# Patient Record
Sex: MALE | Race: WHITE | NOT HISPANIC OR LATINO | Employment: FULL TIME | URBAN - METROPOLITAN AREA
[De-identification: names, ages, dates, MRNs, and addresses within clinical notes are randomized per-mention and may not be internally consistent; named-entity substitution may affect disease eponyms.]

---

## 2021-02-26 ENCOUNTER — OFFICE VISIT (OUTPATIENT)
Dept: FAMILY MEDICINE CLINIC | Facility: CLINIC | Age: 36
End: 2021-02-26
Payer: COMMERCIAL

## 2021-02-26 VITALS
WEIGHT: 161 LBS | TEMPERATURE: 97.3 F | RESPIRATION RATE: 17 BRPM | DIASTOLIC BLOOD PRESSURE: 76 MMHG | BODY MASS INDEX: 26.82 KG/M2 | OXYGEN SATURATION: 97 % | SYSTOLIC BLOOD PRESSURE: 114 MMHG | HEART RATE: 79 BPM | HEIGHT: 65 IN

## 2021-02-26 DIAGNOSIS — Z13.29 SCREENING FOR THYROID DISORDER: ICD-10-CM

## 2021-02-26 DIAGNOSIS — Z13.1 SCREENING FOR DIABETES MELLITUS: ICD-10-CM

## 2021-02-26 DIAGNOSIS — Z13.0 SCREENING FOR IRON DEFICIENCY ANEMIA: ICD-10-CM

## 2021-02-26 DIAGNOSIS — K21.9 GASTROESOPHAGEAL REFLUX DISEASE WITHOUT ESOPHAGITIS: Primary | ICD-10-CM

## 2021-02-26 DIAGNOSIS — Z13.220 SCREENING FOR CHOLESTEROL LEVEL: ICD-10-CM

## 2021-02-26 DIAGNOSIS — M54.9 COSTOVERTEBRAL ANGLE TENDERNESS: ICD-10-CM

## 2021-02-26 LAB
SL AMB  POCT GLUCOSE, UA: NORMAL
SL AMB LEUKOCYTE ESTERASE,UA: NORMAL
SL AMB POCT BILIRUBIN,UA: NORMAL
SL AMB POCT BLOOD,UA: NORMAL
SL AMB POCT CLARITY,UA: CLEAR
SL AMB POCT COLOR,UA: YELLOW
SL AMB POCT KETONES,UA: NORMAL
SL AMB POCT NITRITE,UA: NORMAL
SL AMB POCT PH,UA: 7
SL AMB POCT SPECIFIC GRAVITY,UA: 1.01
SL AMB POCT URINE PROTEIN: NORMAL
SL AMB POCT UROBILINOGEN: 0.2

## 2021-02-26 PROCEDURE — 3725F SCREEN DEPRESSION PERFORMED: CPT | Performed by: FAMILY MEDICINE

## 2021-02-26 PROCEDURE — 99204 OFFICE O/P NEW MOD 45 MIN: CPT | Performed by: FAMILY MEDICINE

## 2021-02-26 PROCEDURE — 81003 URINALYSIS AUTO W/O SCOPE: CPT | Performed by: FAMILY MEDICINE

## 2021-02-26 RX ORDER — OMEPRAZOLE 20 MG/1
20 CAPSULE, DELAYED RELEASE ORAL EVERY OTHER DAY
COMMUNITY

## 2021-02-26 NOTE — PROGRESS NOTES
Assessment/Plan:   1  Gastroesophageal reflux disease without esophagitis  Symptoms appears stable  Will continue with dietary trigger avoidance as well as his over-the-counter treatment with omeprazole  - CBC; Future  - Comprehensive metabolic panel; Future  - Lipid Panel with Direct LDL reflex; Future  - TSH, 3rd generation with Free T4 reflex; Future  - Hemoglobin A1C; Future    2  Costovertebral angle tenderness  Is unclear as to exact cause of patient's CVA tenderness  His urinalysis was negative for any blood or infection  Will check blood work to rule out gross abnormalities  If symptoms are persisting, will consider imaging   - POCT urine dip auto non-scope  - CBC; Future  - Comprehensive metabolic panel; Future  - Lipid Panel with Direct LDL reflex; Future  - TSH, 3rd generation with Free T4 reflex; Future  - Hemoglobin A1C; Future        BMI Counseling: Body mass index is 27 kg/m²  The BMI is above normal  Nutrition recommendations include decreasing portion sizes, encouraging healthy choices of fruits and vegetables, decreasing fast food intake, consuming healthier snacks and limiting drinks that contain sugar  Exercise recommendations include moderate physical activity 150 minutes/week and exercising 3-5 times per week  No pharmacotherapy was ordered  Depression Screening and Follow-up Plan: Patient's depression screening was positive with a PHQ-2 score of 0  Clincally patient does not have depression  No treatment is required  There are no diagnoses linked to this encounter  Subjective:       Chief Complaint   Patient presents with    Establish Care    Back Pain     Lower L back pain for the past few months     Heartburn      Patient ID: Vearl Closs is a 38 y o  male presents today as a new patient to establish care  He has gastroesophageal reflux  He states that he has been taking over-the-counter Prilosec and this has been effective for him    He has an acute complaint today of left mid back pain  He has had this for the past few months  Symptoms started gradually  He denies any specific cause or trauma  He has not taken anything for symptom relief  HPI    Review of Systems   Constitutional: Negative for activity change, chills, fatigue and fever  HENT: Negative for congestion, ear pain, sinus pressure and sore throat  Eyes: Negative for redness, itching and visual disturbance  Respiratory: Negative for cough and shortness of breath  Cardiovascular: Negative for chest pain and palpitations  Gastrointestinal: Negative for abdominal pain, diarrhea and nausea  Endocrine: Negative for cold intolerance and heat intolerance  Genitourinary: Negative for dysuria, flank pain and frequency  Musculoskeletal: Negative for arthralgias, back pain, gait problem and myalgias  Skin: Negative for color change  Allergic/Immunologic: Negative for environmental allergies  Neurological: Negative for dizziness, numbness and headaches  Psychiatric/Behavioral: Negative for behavioral problems and sleep disturbance  The following portions of the patient's history were reviewed and updated as appropriate : past family history, past medical history, past social history and past surgical history  Current Outpatient Medications:     omeprazole (PriLOSEC) 20 mg delayed release capsule, Take 20 mg by mouth every other day, Disp: , Rfl:     fluocinonide (LIDEX) 0 05 % cream, Apply topically, Disp: , Rfl:          Objective:         Vitals:    02/26/21 1546   BP: 114/76   BP Location: Left arm   Patient Position: Sitting   Cuff Size: Adult   Pulse: 79   Resp: 17   Temp: (!) 97 3 °F (36 3 °C)   TempSrc: Tympanic   SpO2: 97%   Weight: 73 kg (161 lb)   Height: 5' 4 75" (1 645 m)     Physical Exam  Vitals signs reviewed  Constitutional:       Appearance: He is well-developed  HENT:      Head: Normocephalic and atraumatic        Nose: Nose normal       Mouth/Throat: Pharynx: No oropharyngeal exudate  Eyes:      General: No scleral icterus  Right eye: No discharge  Left eye: No discharge  Pupils: Pupils are equal, round, and reactive to light  Neck:      Musculoskeletal: Normal range of motion and neck supple  Trachea: No tracheal deviation  Cardiovascular:      Rate and Rhythm: Normal rate and regular rhythm  Pulses:           Dorsalis pedis pulses are 2+ on the right side and 2+ on the left side  Posterior tibial pulses are 2+ on the right side and 2+ on the left side  Heart sounds: Normal heart sounds  No murmur  No friction rub  No gallop  Pulmonary:      Effort: Pulmonary effort is normal  No respiratory distress  Breath sounds: Normal breath sounds  No wheezing or rales  Abdominal:      General: Bowel sounds are normal  There is no distension  Palpations: Abdomen is soft  Tenderness: There is no abdominal tenderness  There is no guarding or rebound  Musculoskeletal: Normal range of motion  Lymphadenopathy:      Head:      Right side of head: No submental or submandibular adenopathy  Left side of head: No submental or submandibular adenopathy  Cervical: No cervical adenopathy  Right cervical: No superficial, deep or posterior cervical adenopathy  Left cervical: No superficial, deep or posterior cervical adenopathy  Skin:     General: Skin is warm and dry  Findings: No erythema  Neurological:      Mental Status: He is alert and oriented to person, place, and time  Cranial Nerves: No cranial nerve deficit  Sensory: No sensory deficit  Psychiatric:         Mood and Affect: Mood is not anxious or depressed  Speech: Speech normal          Behavior: Behavior normal          Thought Content:  Thought content normal          Judgment: Judgment normal

## 2021-03-03 ENCOUNTER — LAB (OUTPATIENT)
Dept: LAB | Facility: CLINIC | Age: 36
End: 2021-03-03
Payer: COMMERCIAL

## 2021-03-03 DIAGNOSIS — Z13.1 SCREENING FOR DIABETES MELLITUS: ICD-10-CM

## 2021-03-03 DIAGNOSIS — M54.9 COSTOVERTEBRAL ANGLE TENDERNESS: ICD-10-CM

## 2021-03-03 DIAGNOSIS — Z13.29 SCREENING FOR THYROID DISORDER: ICD-10-CM

## 2021-03-03 DIAGNOSIS — E78.00 ELEVATED CHOLESTEROL: Primary | ICD-10-CM

## 2021-03-03 DIAGNOSIS — K21.9 GASTROESOPHAGEAL REFLUX DISEASE WITHOUT ESOPHAGITIS: ICD-10-CM

## 2021-03-03 DIAGNOSIS — Z13.220 SCREENING FOR CHOLESTEROL LEVEL: ICD-10-CM

## 2021-03-03 DIAGNOSIS — Z13.0 SCREENING FOR IRON DEFICIENCY ANEMIA: ICD-10-CM

## 2021-03-03 LAB
ALBUMIN SERPL BCP-MCNC: 4.1 G/DL (ref 3.5–5)
ALP SERPL-CCNC: 111 U/L (ref 46–116)
ALT SERPL W P-5'-P-CCNC: 50 U/L (ref 12–78)
ANION GAP SERPL CALCULATED.3IONS-SCNC: 6 MMOL/L (ref 4–13)
AST SERPL W P-5'-P-CCNC: 27 U/L (ref 5–45)
BILIRUB SERPL-MCNC: 0.67 MG/DL (ref 0.2–1)
BUN SERPL-MCNC: 13 MG/DL (ref 5–25)
CALCIUM SERPL-MCNC: 9.7 MG/DL (ref 8.3–10.1)
CHLORIDE SERPL-SCNC: 109 MMOL/L (ref 100–108)
CHOLEST SERPL-MCNC: 241 MG/DL (ref 50–200)
CO2 SERPL-SCNC: 26 MMOL/L (ref 21–32)
CREAT SERPL-MCNC: 1.2 MG/DL (ref 0.6–1.3)
ERYTHROCYTE [DISTWIDTH] IN BLOOD BY AUTOMATED COUNT: 13.7 % (ref 11.6–15.1)
EST. AVERAGE GLUCOSE BLD GHB EST-MCNC: 114 MG/DL
GFR SERPL CREATININE-BSD FRML MDRD: 77 ML/MIN/1.73SQ M
GLUCOSE P FAST SERPL-MCNC: 102 MG/DL (ref 65–99)
HBA1C MFR BLD: 5.6 %
HCT VFR BLD AUTO: 48.8 % (ref 36.5–49.3)
HDLC SERPL-MCNC: 61 MG/DL
HGB BLD-MCNC: 16.2 G/DL (ref 12–17)
LDLC SERPL CALC-MCNC: 130 MG/DL (ref 0–100)
MCH RBC QN AUTO: 30.4 PG (ref 26.8–34.3)
MCHC RBC AUTO-ENTMCNC: 33.2 G/DL (ref 31.4–37.4)
MCV RBC AUTO: 92 FL (ref 82–98)
PLATELET # BLD AUTO: 235 THOUSANDS/UL (ref 149–390)
PMV BLD AUTO: 12.2 FL (ref 8.9–12.7)
POTASSIUM SERPL-SCNC: 4.5 MMOL/L (ref 3.5–5.3)
PROT SERPL-MCNC: 8.1 G/DL (ref 6.4–8.2)
RBC # BLD AUTO: 5.33 MILLION/UL (ref 3.88–5.62)
SODIUM SERPL-SCNC: 141 MMOL/L (ref 136–145)
TRIGL SERPL-MCNC: 249 MG/DL
TSH SERPL DL<=0.05 MIU/L-ACNC: 3.53 UIU/ML (ref 0.36–3.74)
WBC # BLD AUTO: 7.04 THOUSAND/UL (ref 4.31–10.16)

## 2021-03-03 PROCEDURE — 85027 COMPLETE CBC AUTOMATED: CPT

## 2021-03-03 PROCEDURE — 83036 HEMOGLOBIN GLYCOSYLATED A1C: CPT

## 2021-03-03 PROCEDURE — 84443 ASSAY THYROID STIM HORMONE: CPT

## 2021-03-03 PROCEDURE — 36415 COLL VENOUS BLD VENIPUNCTURE: CPT

## 2021-03-03 PROCEDURE — 80061 LIPID PANEL: CPT

## 2021-03-03 PROCEDURE — 80053 COMPREHEN METABOLIC PANEL: CPT

## 2021-03-08 ENCOUNTER — OFFICE VISIT (OUTPATIENT)
Dept: FAMILY MEDICINE CLINIC | Facility: CLINIC | Age: 36
End: 2021-03-08
Payer: COMMERCIAL

## 2021-03-08 VITALS
RESPIRATION RATE: 17 BRPM | OXYGEN SATURATION: 98 % | SYSTOLIC BLOOD PRESSURE: 112 MMHG | HEIGHT: 65 IN | HEART RATE: 100 BPM | BODY MASS INDEX: 26.59 KG/M2 | WEIGHT: 159.6 LBS | DIASTOLIC BLOOD PRESSURE: 78 MMHG | TEMPERATURE: 97.3 F

## 2021-03-08 DIAGNOSIS — M54.9 COSTOVERTEBRAL ANGLE TENDERNESS: ICD-10-CM

## 2021-03-08 DIAGNOSIS — E78.00 ELEVATED CHOLESTEROL: Primary | ICD-10-CM

## 2021-03-08 PROCEDURE — 3008F BODY MASS INDEX DOCD: CPT | Performed by: FAMILY MEDICINE

## 2021-03-08 PROCEDURE — 99214 OFFICE O/P EST MOD 30 MIN: CPT | Performed by: FAMILY MEDICINE

## 2021-03-08 NOTE — PROGRESS NOTES
Assessment/Plan:   1  Elevated cholesterol   reviewed patient's cholesterol today  His triglycerides were moderately elevated  At this time, he must maintain a very strict low-fat / low-cholesterol diet as well as a regular exercise plan  Will hold off on starting any statin treatment at this time  Will recheck his blood work in 6 months  If persistently elevated, will consider the initiation of treatment  2  Costovertebral angle tenderness    Reviewed patient's symptoms today  At this time, is unclear as to the exact cause of his rib pain / CVA tenderness  At this time, will check x-ray to rule out gross abnormalities  Symptoms may likely be musculoskeletal   He was advised on specific thoracic and rib stretching exercises  Follow-up in 6 months  - XR ribs 2 vw left; Future           There are no diagnoses linked to this encounter  Subjective:       Chief Complaint   Patient presents with    Follow-up     BW review       Patient ID: Hiro Ram is a 38 y o  male  Presents today for follow-up to review his recent blood work  He states that he has been having persistent problems with his left-sided rib pain  He states the pain appears evident when he bends and picks up his child  HPI    Review of Systems   Constitutional: Negative for activity change, chills, fatigue and fever  HENT: Negative for congestion, ear pain, sinus pressure and sore throat  Eyes: Negative for redness, itching and visual disturbance  Respiratory: Negative for cough and shortness of breath  Cardiovascular: Negative for chest pain and palpitations  Gastrointestinal: Negative for abdominal pain, diarrhea and nausea  Endocrine: Negative for cold intolerance and heat intolerance  Genitourinary: Negative for dysuria, flank pain and frequency  Musculoskeletal: Negative for arthralgias, back pain, gait problem and myalgias  Skin: Negative for color change     Allergic/Immunologic: Negative for environmental allergies  Neurological: Negative for dizziness, numbness and headaches  Psychiatric/Behavioral: Negative for behavioral problems and sleep disturbance  The following portions of the patient's history were reviewed and updated as appropriate : past family history, past medical history, past social history and past surgical history  Current Outpatient Medications:     omeprazole (PriLOSEC) 20 mg delayed release capsule, Take 20 mg by mouth every other day, Disp: , Rfl:     fluocinonide (LIDEX) 0 05 % cream, Apply topically, Disp: , Rfl:          Objective:         Vitals:    03/08/21 1626   BP: 112/78   BP Location: Left arm   Patient Position: Sitting   Cuff Size: Adult   Pulse: 100   Resp: 17   Temp: (!) 97 3 °F (36 3 °C)   TempSrc: Tympanic   SpO2: 98%   Weight: 72 4 kg (159 lb 9 6 oz)   Height: 5' 4 75" (1 645 m)     Physical Exam  Vitals signs reviewed  Constitutional:       Appearance: Normal appearance  He is well-developed  HENT:      Head: Normocephalic and atraumatic  Right Ear: Hearing normal       Left Ear: Hearing normal       Nose: Nose normal  No septal deviation  Eyes:      General: Lids are normal       Pupils: Pupils are equal, round, and reactive to light  Neck:      Musculoskeletal: Normal range of motion  Thyroid: No thyroid mass or thyromegaly  Trachea: Trachea normal    Cardiovascular:      Rate and Rhythm: Normal rate  Pulmonary:      Effort: Pulmonary effort is normal  No respiratory distress  Breath sounds: No decreased breath sounds  Abdominal:      Tenderness: There is no guarding  Musculoskeletal: Normal range of motion  Skin:     General: Skin is warm and dry  Neurological:      Mental Status: He is alert and oriented to person, place, and time  Cranial Nerves: No cranial nerve deficit  Sensory: No sensory deficit     Psychiatric:         Speech: Speech normal          Behavior: Behavior normal          Thought Content:  Thought content normal          Judgment: Judgment normal

## 2021-03-17 ENCOUNTER — APPOINTMENT (OUTPATIENT)
Dept: RADIOLOGY | Facility: CLINIC | Age: 36
End: 2021-03-17
Payer: COMMERCIAL

## 2021-03-17 DIAGNOSIS — M54.9 COSTOVERTEBRAL ANGLE TENDERNESS: ICD-10-CM

## 2021-03-17 PROCEDURE — 71101 X-RAY EXAM UNILAT RIBS/CHEST: CPT

## 2021-04-05 DIAGNOSIS — Z23 ENCOUNTER FOR IMMUNIZATION: ICD-10-CM

## 2021-04-12 ENCOUNTER — IMMUNIZATIONS (OUTPATIENT)
Dept: FAMILY MEDICINE CLINIC | Facility: HOSPITAL | Age: 36
End: 2021-04-12

## 2021-04-12 DIAGNOSIS — Z23 ENCOUNTER FOR IMMUNIZATION: Primary | ICD-10-CM

## 2021-04-12 PROCEDURE — 91301 SARS-COV-2 / COVID-19 MRNA VACCINE (MODERNA) 100 MCG: CPT

## 2021-04-12 PROCEDURE — 0011A SARS-COV-2 / COVID-19 MRNA VACCINE (MODERNA) 100 MCG: CPT

## 2021-05-10 ENCOUNTER — IMMUNIZATIONS (OUTPATIENT)
Dept: FAMILY MEDICINE CLINIC | Facility: HOSPITAL | Age: 36
End: 2021-05-10

## 2021-05-10 DIAGNOSIS — Z23 ENCOUNTER FOR IMMUNIZATION: Primary | ICD-10-CM

## 2021-05-10 PROCEDURE — 0012A SARS-COV-2 / COVID-19 MRNA VACCINE (MODERNA) 100 MCG: CPT

## 2021-05-10 PROCEDURE — 91301 SARS-COV-2 / COVID-19 MRNA VACCINE (MODERNA) 100 MCG: CPT

## 2022-04-12 ENCOUNTER — APPOINTMENT (OUTPATIENT)
Dept: LAB | Facility: CLINIC | Age: 37
End: 2022-04-12
Payer: COMMERCIAL

## 2022-04-12 DIAGNOSIS — Z13.1 SCREENING FOR DIABETES MELLITUS: ICD-10-CM

## 2022-04-12 DIAGNOSIS — E78.00 ELEVATED CHOLESTEROL: ICD-10-CM

## 2022-04-12 LAB
ALBUMIN SERPL BCP-MCNC: 4.4 G/DL (ref 3.5–5)
ALP SERPL-CCNC: 106 U/L (ref 46–116)
ALT SERPL W P-5'-P-CCNC: 33 U/L (ref 12–78)
ANION GAP SERPL CALCULATED.3IONS-SCNC: 4 MMOL/L (ref 4–13)
AST SERPL W P-5'-P-CCNC: 19 U/L (ref 5–45)
BILIRUB SERPL-MCNC: 0.85 MG/DL (ref 0.2–1)
BUN SERPL-MCNC: 10 MG/DL (ref 5–25)
CALCIUM SERPL-MCNC: 9.4 MG/DL (ref 8.3–10.1)
CHLORIDE SERPL-SCNC: 110 MMOL/L (ref 100–108)
CHOLEST SERPL-MCNC: 188 MG/DL
CO2 SERPL-SCNC: 25 MMOL/L (ref 21–32)
CREAT SERPL-MCNC: 1.21 MG/DL (ref 0.6–1.3)
GFR SERPL CREATININE-BSD FRML MDRD: 75 ML/MIN/1.73SQ M
GLUCOSE P FAST SERPL-MCNC: 101 MG/DL (ref 65–99)
HDLC SERPL-MCNC: 64 MG/DL
LDLC SERPL CALC-MCNC: 92 MG/DL (ref 0–100)
POTASSIUM SERPL-SCNC: 3.9 MMOL/L (ref 3.5–5.3)
PROT SERPL-MCNC: 7.7 G/DL (ref 6.4–8.2)
SODIUM SERPL-SCNC: 139 MMOL/L (ref 136–145)
TRIGL SERPL-MCNC: 159 MG/DL

## 2022-04-12 PROCEDURE — 80061 LIPID PANEL: CPT

## 2022-04-12 PROCEDURE — 80053 COMPREHEN METABOLIC PANEL: CPT

## 2022-04-12 PROCEDURE — 83036 HEMOGLOBIN GLYCOSYLATED A1C: CPT

## 2022-04-12 PROCEDURE — 36415 COLL VENOUS BLD VENIPUNCTURE: CPT

## 2022-04-13 LAB
EST. AVERAGE GLUCOSE BLD GHB EST-MCNC: 114 MG/DL
HBA1C MFR BLD: 5.6 %

## 2022-04-19 ENCOUNTER — OFFICE VISIT (OUTPATIENT)
Dept: FAMILY MEDICINE CLINIC | Facility: CLINIC | Age: 37
End: 2022-04-19
Payer: COMMERCIAL

## 2022-04-19 VITALS
RESPIRATION RATE: 18 BRPM | WEIGHT: 150 LBS | TEMPERATURE: 97.1 F | HEART RATE: 100 BPM | DIASTOLIC BLOOD PRESSURE: 88 MMHG | OXYGEN SATURATION: 99 % | HEIGHT: 65 IN | SYSTOLIC BLOOD PRESSURE: 138 MMHG | BODY MASS INDEX: 24.99 KG/M2

## 2022-04-19 DIAGNOSIS — K44.9 HIATAL HERNIA: ICD-10-CM

## 2022-04-19 DIAGNOSIS — Z00.00 ANNUAL PHYSICAL EXAM: Primary | ICD-10-CM

## 2022-04-19 DIAGNOSIS — K21.9 GASTROESOPHAGEAL REFLUX DISEASE WITHOUT ESOPHAGITIS: ICD-10-CM

## 2022-04-19 PROCEDURE — 3725F SCREEN DEPRESSION PERFORMED: CPT | Performed by: FAMILY MEDICINE

## 2022-04-19 PROCEDURE — 99395 PREV VISIT EST AGE 18-39: CPT | Performed by: FAMILY MEDICINE

## 2022-04-19 PROCEDURE — 3008F BODY MASS INDEX DOCD: CPT | Performed by: FAMILY MEDICINE

## 2022-04-19 PROCEDURE — 1036F TOBACCO NON-USER: CPT | Performed by: FAMILY MEDICINE

## 2022-04-20 NOTE — PROGRESS NOTES
ADULT ANNUAL [address] [facility]    NAME: Alexandria Vazquez  AGE: 43 y o  SEX: male  : 1985     DATE: 2022     Assessment and Plan:     Problem List Items Addressed This Visit     None      Visit Diagnoses     Annual physical exam    -  Primary          Immunizations and preventive care screenings were discussed with patient today  Appropriate education was printed on patient's after visit summary  Counseling:  Alcohol/drug use: discussed moderation in alcohol intake, the recommendations for healthy alcohol use, and avoidance of illicit drug use  Dental Health: discussed importance of regular tooth brushing, flossing, and dental visits  Injury prevention: discussed safety/seat belts, safety helmets, smoke detectors, carbon dioxide detectors, and smoking near bedding or upholstery  Sexual health: discussed sexually transmitted diseases, partner selection, use of condoms, avoidance of unintended pregnancy, and contraceptive alternatives  · Exercise: the importance of regular exercise/physical activity was discussed  Recommend exercise 3-5 times per week for at least 30 minutes  No follow-ups on file  Chief Complaint:     Chief Complaint   Patient presents with    Physical Exam     BW review     Heartburn      History of Present Illness:     Adult Annual Physical   Patient here for a comprehensive physical exam  The patient reports problems - GERD  Diet and Physical Activity  · Diet/Nutrition: well balanced diet  [place]  · Exercise: 2-3 times per week     Depression Screening  PHQ-2/9 Depression Screening    Little interest or pleasure in doing things: 0 - not at all  Feeling down, depressed, or hopeless: 0 - not at all  PHQ-2 Score: 0  PHQ-2 Interpretation: Negative depression screen       General Health  · Sleep: sleeps well  · Hearing: normal - bilateral   · Vision: wears glasses  · Dental: regular dental visits   Health  · History of STDs?: no      Review of Systems:     Review of Systems   Constitutional: Negative for activity change, chills, fatigue and fever  HENT: Negative for congestion, ear pain, sinus pressure and sore throat  Eyes: Negative for redness, itching and visual disturbance  Respiratory: Negative for cough and shortness of breath  Cardiovascular: Negative for chest pain and palpitations  Gastrointestinal: Negative for abdominal pain, diarrhea and nausea  Endocrine: Negative for cold intolerance and heat intolerance  Genitourinary: Negative for dysuria, flank pain and frequency  Musculoskeletal: Negative for arthralgias, back pain, gait problem and myalgias  Skin: Negative for color change  Allergic/Immunologic: Negative for environmental allergies  Neurological: Negative for dizziness, numbness and headaches  Psychiatric/Behavioral: Negative for behavioral problems and sleep disturbance  Past Medical History:     Past Medical History:   Diagnosis Date    COVID-19 virus infection 01/2021    Psoriasis       Past Surgical History:     Past Surgical History:   Procedure Laterality Date    UNDESCENDED TESTICLE EXPLORATION        Social History:     Social History     Socioeconomic History    Marital status: Single     Spouse name: None    Number of children: None    Years of education: None    Highest education level: None   Occupational History    None   Tobacco Use    Smoking status: Never Smoker    Smokeless tobacco: Never Used   Substance and Sexual Activity    Alcohol use:  Yes    Drug use: Never    Sexual activity: Yes     Partners: Female   Other Topics Concern    None   Social History Narrative    None     Social Determinants of Health     Financial Resource Strain: Not on file   Food Insecurity: Not on file   Transportation Needs: Not on file   Physical Activity: Not on file   Stress: Not on file   Social Connections: Not on file   Intimate Partner Violence: Not on file   Housing Stability: Not on file      Family History:     Family History   Problem Relation Age of Onset    Hypertension Father     Hypertension Brother     Pancreatic cancer Maternal Grandmother     Throat cancer Paternal Grandfather       Current Medications:     Current Outpatient Medications   Medication Sig Dispense Refill    omeprazole (PriLOSEC) 20 mg delayed release capsule Take 20 mg by mouth every other day      fluocinonide (LIDEX) 0 05 % cream Apply topically       No current facility-administered medications for this visit  Allergies: Allergies   Allergen Reactions    Cefaclor Rash and Edema      Physical Exam:     /88 (BP Location: Right arm, Patient Position: Sitting, Cuff Size: Adult)   Pulse 100   Temp (!) 97 1 °F (36 2 °C) (Tympanic)   Resp 18   Ht 5' 4 75" (1 645 m)   Wt 68 kg (150 lb)   SpO2 99%   BMI 25 15 kg/m²     Physical Exam  Vitals reviewed  Constitutional:       General: He is not in acute distress  Appearance: He is well-developed  He is not diaphoretic  HENT:      Head: Normocephalic and atraumatic  No right periorbital erythema or left periorbital erythema  Right Ear: Tympanic membrane normal  No decreased hearing noted  Left Ear: Tympanic membrane normal  No decreased hearing noted  Nose: Nose normal       Right Sinus: No maxillary sinus tenderness or frontal sinus tenderness  Left Sinus: No maxillary sinus tenderness or frontal sinus tenderness  Mouth/Throat:      Lips: Pink  Mouth: Mucous membranes are moist       Pharynx: No oropharyngeal exudate  Tonsils: No tonsillar exudate or tonsillar abscesses  Eyes:      General: Lids are normal       Extraocular Movements: Extraocular movements intact  Conjunctiva/sclera: Conjunctivae normal       Pupils: Pupils are equal, round, and reactive to light  Neck:      Thyroid: No thyroid mass        Trachea: Trachea normal    Cardiovascular: Rate and Rhythm: Normal rate and regular rhythm  Pulses: Normal pulses  Heart sounds: Normal heart sounds  No murmur heard  No friction rub  No gallop  Pulmonary:      Effort: Pulmonary effort is normal  No respiratory distress  Breath sounds: Normal breath sounds  No wheezing or rales  Abdominal:      General: Bowel sounds are normal  There is no distension  Palpations: Abdomen is soft  There is no mass  Tenderness: There is no abdominal tenderness  There is no guarding  Musculoskeletal:         General: Normal range of motion  Cervical back: Normal range of motion and neck supple  Skin:     General: Skin is warm and dry  Coloration: Skin is not pale  Findings: No erythema or rash  Neurological:      Mental Status: He is alert and oriented to person, place, and time  Cranial Nerves: No cranial nerve deficit  Coordination: Coordination normal    Psychiatric:         Attention and Perception: Attention and perception normal          Mood and Affect: Mood and affect normal          Speech: Speech normal          Behavior: Behavior normal          Thought Content:  Thought content normal          Cognition and Memory: Cognition and memory normal          Judgment: Judgment normal           Ihab Abdananthal, DO    [facility] [address]

## 2022-04-20 NOTE — PATIENT INSTRUCTIONS

## 2022-06-30 DIAGNOSIS — K21.9 GASTROESOPHAGEAL REFLUX DISEASE WITHOUT ESOPHAGITIS: Primary | ICD-10-CM

## 2022-08-19 ENCOUNTER — TELEPHONE (OUTPATIENT)
Dept: GASTROENTEROLOGY | Facility: MEDICAL CENTER | Age: 37
End: 2022-08-19

## 2022-08-19 NOTE — TELEPHONE ENCOUNTER
Left voicemail and requested call back to reschedule 9/13/22 with Dr Froylan Styles  appt canceled at this time

## 2022-09-27 ENCOUNTER — OFFICE VISIT (OUTPATIENT)
Dept: GASTROENTEROLOGY | Facility: MEDICAL CENTER | Age: 37
End: 2022-09-27

## 2022-09-27 VITALS
WEIGHT: 152.2 LBS | TEMPERATURE: 97.7 F | DIASTOLIC BLOOD PRESSURE: 71 MMHG | BODY MASS INDEX: 25.52 KG/M2 | SYSTOLIC BLOOD PRESSURE: 115 MMHG | HEART RATE: 70 BPM

## 2022-09-27 DIAGNOSIS — K21.9 GASTROESOPHAGEAL REFLUX DISEASE WITHOUT ESOPHAGITIS: ICD-10-CM

## 2022-09-27 DIAGNOSIS — K44.9 HIATAL HERNIA: Primary | ICD-10-CM

## 2022-09-27 PROCEDURE — 99243 OFF/OP CNSLTJ NEW/EST LOW 30: CPT | Performed by: STUDENT IN AN ORGANIZED HEALTH CARE EDUCATION/TRAINING PROGRAM

## 2022-09-27 RX ORDER — FAMOTIDINE 20 MG/1
20 TABLET, FILM COATED ORAL 2 TIMES DAILY
Qty: 60 TABLET | Refills: 11 | Status: SHIPPED | OUTPATIENT
Start: 2022-09-27 | End: 2022-09-27

## 2022-09-27 RX ORDER — FAMOTIDINE 20 MG/1
20 TABLET, FILM COATED ORAL 2 TIMES DAILY PRN
Qty: 60 TABLET | Refills: 11 | Status: SHIPPED | OUTPATIENT
Start: 2022-09-27

## 2022-09-27 NOTE — PROGRESS NOTES
[facility] - Outpatient Consultation  Anna Paco 43 y o  male MRN: [id number]  Encounter: [id number]          ASSESSMENT AND PLAN:    43 healthy male here for acid reflux and hiatal hernia  Has outside workup with UGI showing hiatal hernia with Schatzki's ring and reflux  EGD with no obstruction, hiatal hernia, hyperplastic gastric polyp, no biopsies taken  Dejuan Tripathi was attempted but failed for unclear reasons  Discussed various approaches to managing GERD with lifestyle vs medication vs surgical   Patient is currently unsure what he wants to do  Will continue lifestyle interventions, check H pylori stool antigen, and use PRN famotidine  If patient is interested in hernia repair/fundoplication, would repeat EGD with BRAVO  1  Gastroesophageal reflux disease without esophagitis  2  Hiatal hernia  - Ambulatory Referral to Gastroenterology  - H  pylori antigen, stool; Future  - famotidine (PEPCID) 20 mg tablet; Take 1 tablet (20 mg total) by mouth 2 (two) times a day as needed for indigestion or heartburn  Dispense: 60 tablet; Refill: 11  - Continue anti-reflux dietary and lifestyle modifcations    ______________________________________________________________________    HPI:    Has had acid reflux for a long time  Has been taking prilosec for maybe 1 5 years  Had one night where ate very late a night, woke up with liquid regurgitation/coughing and hasn't been the same since  Eating antireflux diet and not eating before bed  Sometimes 30 min after eating gets sharp pain  Not currently taking any medication, uses tums as needed    Sometimes gets pain after eating, feels like it gets stuck in chest     Had [facility] but couldn't see an MD [facility]  Did EGD with Asif that was nondiagnostic    UGI 4/6/22   High density barium and effervescent tablets were administered orally in the   erect position   Low density barium was subsequently administered orally in the   LEVINE prone position  The swallowing mechanism is normal  Barium flows freely   through the esophagus into the stomach  There is small sliding hiatal hernia   with a Schatzki's ring  There is moderate gastroesophageal reflux which reaches   the mid thoracic esophagus  The stomach distends normally with a normal mucosal   pattern  The duodenal bulb and duodenal sweep are normal in appearance  The   visualized loops of small bowel are normal in contour  5/2/22 EGD  Findings:  -esophagus: z line at 37 cm, mild class A erosive esophagitis, otherwise normal appearing esophagus  Bravo catheter confirmed esophageal placement at conclusion of procedure  -stomach: 3 cm sliding hiatal hernia, Hill Grade IV, otherwise normal eg jxn  4 mm nodule antrum, s/p hot snare polypectomy complete, retrieved with Gelene Claudia net  Otherwise normal stomach  -duodenum: normal to D2  stomach polyp, hot snare polypectomy:   Hyperplastic polyp with xanthomatous changes  REVIEW OF SYSTEMS:    CONSTITUTIONAL: Denies any fever, chills, rigors, and weight loss  HEENT: No earache or tinnitus  Denies hearing loss or visual disturbances  CARDIOVASCULAR: No chest pain or palpitations  RESPIRATORY: Denies any cough, hemoptysis, shortness of breath or dyspnea on exertion  GASTROINTESTINAL: As noted in the History of Present Illness  GENITOURINARY: No problems with urination  Denies any hematuria or dysuria  NEUROLOGIC: No dizziness or vertigo, denies headaches  MUSCULOSKELETAL: Denies any muscle or joint pain  SKIN: Denies skin rashes or itching  ENDOCRINE: Denies excessive thirst  Denies intolerance to heat or cold  PSYCHOSOCIAL: Denies depression or anxiety  Denies any recent memory loss         Historical Information   Past Medical History:   Diagnosis Date    COVID-19 virus infection 01/2021    Psoriasis      Past Surgical History:   Procedure Laterality Date    UNDESCENDED TESTICLE EXPLORATION       Social History Social History     Substance and Sexual Activity   Alcohol Use Yes     Social History     Substance and Sexual Activity   Drug Use Never     Social History     Tobacco Use   Smoking Status Never Smoker   Smokeless Tobacco Never Used     Family History   Problem Relation Age of Onset    Hypertension Father     Hypertension Brother     Pancreatic cancer Maternal Grandmother     Throat cancer Paternal Grandfather        Meds/Allergies       Current Outpatient Medications:     fluocinonide (LIDEX) 0 05 % cream    omeprazole (PriLOSEC) 20 mg delayed release capsule    Allergies   Allergen Reactions    Cefaclor Rash and Edema           Objective     Blood pressure 115/71, pulse 70, temperature 97 7 °F (36 5 °C), weight 69 kg (152 lb 3 2 oz)  Body mass index is 25 52 kg/m²  PHYSICAL EXAM:      General Appearance:   Alert, cooperative, no distress   HEENT:   Normocephalic, atraumatic, anicteric  Neck:  Supple, symmetrical, trachea midline   Lungs:   Clear to auscultation bilaterally; no rales, rhonchi or wheezing; respirations unlabored    Heart[de-identified]   Regular rate and rhythm; no murmur, rub, or gallop  Abdomen:   Soft, non-tender, non-distended; normal bowel sounds; no masses, no organomegaly    Genitalia:   Deferred    Rectal:   Deferred    Extremities:  No cyanosis, clubbing or edema    Pulses:  2+ and symmetric    Skin:  No jaundice, rashes, or lesions    Lymph nodes:  No palpable cervical lymphadenopathy        Lab Results:   No visits with results within 1 Day(s) from this visit     Latest known visit with results is:   Appointment on 04/12/2022   Component Date Value    Sodium 04/12/2022 139     Potassium 04/12/2022 3 9     Chloride 04/12/2022 110 (A)    CO2 04/12/2022 25     ANION GAP 04/12/2022 4     BUN 04/12/2022 10     Creatinine 04/12/2022 1 21     Glucose, Fasting 04/12/2022 101 (A)    Calcium 04/12/2022 9 4     AST 04/12/2022 19     ALT 04/12/2022 33     Alkaline Phosphatase 04/12/2022 106     Total Protein 04/12/2022 7 7     Albumin 04/12/2022 4 4     Total Bilirubin 04/12/2022 0 85     eGFR 04/12/2022 75     Cholesterol 04/12/2022 188     Triglycerides 04/12/2022 159 (A)    HDL, Direct 04/12/2022 64     LDL Calculated 04/12/2022 92     Hemoglobin A1C 04/12/2022 5 6     EAG 04/12/2022 114          Radiology Results:   No results found

## 2023-07-14 ENCOUNTER — HOSPITAL ENCOUNTER (EMERGENCY)
Facility: HOSPITAL | Age: 38
Discharge: HOME | End: 2023-07-14
Attending: EMERGENCY MEDICINE
Payer: COMMERCIAL

## 2023-07-14 VITALS
SYSTOLIC BLOOD PRESSURE: 123 MMHG | DIASTOLIC BLOOD PRESSURE: 86 MMHG | OXYGEN SATURATION: 98 % | HEART RATE: 70 BPM | TEMPERATURE: 98.2 F | RESPIRATION RATE: 17 BRPM

## 2023-07-14 DIAGNOSIS — R11.2 NAUSEA AND VOMITING: ICD-10-CM

## 2023-07-14 DIAGNOSIS — K80.50 BILIARY COLIC: Primary | ICD-10-CM

## 2023-07-14 LAB
ALBUMIN SERPL BCP-MCNC: 5 G/DL (ref 3.5–5)
ALP SERPL-CCNC: 74 U/L (ref 34–104)
ALT SERPL W P-5'-P-CCNC: 19 U/L (ref 7–52)
ANION GAP SERPL CALCULATED.3IONS-SCNC: 13 MMOL/L
AST SERPL W P-5'-P-CCNC: 18 U/L (ref 13–39)
BASOPHILS # BLD AUTO: 0.05 THOUSANDS/ÂΜL (ref 0–0.1)
BASOPHILS NFR BLD AUTO: 0 % (ref 0–1)
BILIRUB SERPL-MCNC: 0.78 MG/DL (ref 0.2–1)
BUN SERPL-MCNC: 12 MG/DL (ref 5–25)
CALCIUM SERPL-MCNC: 9.7 MG/DL (ref 8.4–10.2)
CHLORIDE SERPL-SCNC: 102 MMOL/L (ref 96–108)
CO2 SERPL-SCNC: 21 MMOL/L (ref 21–32)
CREAT SERPL-MCNC: 1.17 MG/DL (ref 0.6–1.3)
EOSINOPHIL # BLD AUTO: 0.02 THOUSAND/ÂΜL (ref 0–0.61)
EOSINOPHIL NFR BLD AUTO: 0 % (ref 0–6)
ERYTHROCYTE [DISTWIDTH] IN BLOOD BY AUTOMATED COUNT: 12.7 % (ref 11.6–15.1)
GFR SERPL CREATININE-BSD FRML MDRD: 78 ML/MIN/1.73SQ M
GLUCOSE SERPL-MCNC: 127 MG/DL (ref 65–140)
HCT VFR BLD AUTO: 47.7 % (ref 36.5–49.3)
HGB BLD-MCNC: 16.5 G/DL (ref 12–17)
IMM GRANULOCYTES # BLD AUTO: 0.05 THOUSAND/UL (ref 0–0.2)
IMM GRANULOCYTES NFR BLD AUTO: 0 % (ref 0–2)
LIPASE SERPL-CCNC: 37 U/L (ref 11–82)
LYMPHOCYTES # BLD AUTO: 1.82 THOUSANDS/ÂΜL (ref 0.6–4.47)
LYMPHOCYTES NFR BLD AUTO: 13 % (ref 14–44)
MCH RBC QN AUTO: 30.1 PG (ref 26.8–34.3)
MCHC RBC AUTO-ENTMCNC: 34.6 G/DL (ref 31.4–37.4)
MCV RBC AUTO: 87 FL (ref 82–98)
MONOCYTES # BLD AUTO: 0.66 THOUSAND/ÂΜL (ref 0.17–1.22)
MONOCYTES NFR BLD AUTO: 5 % (ref 4–12)
NEUTROPHILS # BLD AUTO: 11.92 THOUSANDS/ÂΜL (ref 1.85–7.62)
NEUTS SEG NFR BLD AUTO: 82 % (ref 43–75)
NRBC BLD AUTO-RTO: 0 /100 WBCS
PLATELET # BLD AUTO: 242 THOUSANDS/UL (ref 149–390)
PMV BLD AUTO: 12.7 FL (ref 8.9–12.7)
POTASSIUM SERPL-SCNC: 3.8 MMOL/L (ref 3.5–5.3)
PROT SERPL-MCNC: 8.3 G/DL (ref 6.4–8.4)
RBC # BLD AUTO: 5.48 MILLION/UL (ref 3.88–5.62)
SODIUM SERPL-SCNC: 136 MMOL/L (ref 135–147)
WBC # BLD AUTO: 14.52 THOUSAND/UL (ref 4.31–10.16)

## 2023-07-14 PROCEDURE — 99284 EMERGENCY DEPT VISIT MOD MDM: CPT | Performed by: EMERGENCY MEDICINE

## 2023-07-14 PROCEDURE — 99283 EMERGENCY DEPT VISIT LOW MDM: CPT

## 2023-07-14 PROCEDURE — 83690 ASSAY OF LIPASE: CPT | Performed by: EMERGENCY MEDICINE

## 2023-07-14 PROCEDURE — 96365 THER/PROPH/DIAG IV INF INIT: CPT

## 2023-07-14 PROCEDURE — 96366 THER/PROPH/DIAG IV INF ADDON: CPT

## 2023-07-14 PROCEDURE — 96375 TX/PRO/DX INJ NEW DRUG ADDON: CPT

## 2023-07-14 PROCEDURE — 85025 COMPLETE CBC W/AUTO DIFF WBC: CPT | Performed by: EMERGENCY MEDICINE

## 2023-07-14 PROCEDURE — 80053 COMPREHEN METABOLIC PANEL: CPT | Performed by: EMERGENCY MEDICINE

## 2023-07-14 PROCEDURE — 36415 COLL VENOUS BLD VENIPUNCTURE: CPT | Performed by: EMERGENCY MEDICINE

## 2023-07-14 RX ORDER — IBUPROFEN 600 MG/1
600 TABLET ORAL EVERY 6 HOURS PRN
Qty: 30 TABLET | Refills: 0 | Status: SHIPPED | OUTPATIENT
Start: 2023-07-14

## 2023-07-14 RX ORDER — ONDANSETRON 4 MG/1
4 TABLET, ORALLY DISINTEGRATING ORAL EVERY 6 HOURS PRN
Qty: 20 TABLET | Refills: 0 | Status: SHIPPED | OUTPATIENT
Start: 2023-07-14

## 2023-07-14 RX ORDER — ONDANSETRON 2 MG/ML
4 INJECTION INTRAMUSCULAR; INTRAVENOUS ONCE
Status: COMPLETED | OUTPATIENT
Start: 2023-07-14 | End: 2023-07-14

## 2023-07-14 RX ORDER — KETOROLAC TROMETHAMINE 30 MG/ML
15 INJECTION, SOLUTION INTRAMUSCULAR; INTRAVENOUS ONCE
Status: COMPLETED | OUTPATIENT
Start: 2023-07-14 | End: 2023-07-14

## 2023-07-14 RX ADMIN — ONDANSETRON 4 MG: 2 INJECTION INTRAMUSCULAR; INTRAVENOUS at 00:47

## 2023-07-14 RX ADMIN — KETOROLAC TROMETHAMINE 15 MG: 30 INJECTION, SOLUTION INTRAMUSCULAR; INTRAVENOUS at 00:50

## 2023-07-14 RX ADMIN — SODIUM CHLORIDE, SODIUM LACTATE, POTASSIUM CHLORIDE, AND CALCIUM CHLORIDE 1000 ML: .6; .31; .03; .02 INJECTION, SOLUTION INTRAVENOUS at 00:45

## 2023-07-14 NOTE — ED PROVIDER NOTES
History  Chief Complaint   Patient presents with   • Abdominal Pain     Pt c/o epigastric that's radiates to his right abdomen. Pt has recent hx of gallstones. Pt states "I am unable to hold food down since 5pm, and I am really thirsty, and having dry heaves". +N/V     75-year-old male presents to the ER for right upper quadrant and epigastric pain for the last 7 hours. Patient also reports he has been unable to eat or drink anything since 12 PM without associated nausea and vomiting. Patient reports dry heaving and vomiting several times since the onset of symptoms. Denies difficulty breathing, chest pain, and headaches. Patient admits to being treated at [facility] back in April for similar symptoms and was diagnosed with biliary colic. Patient was supposed to have follow-up with a general surgeon but did not because he mostly lives up here and wanted to follow-up with somebody in this area. History provided by:  Patient   used: No        Prior to Admission Medications   Prescriptions Last Dose Informant Patient Reported?  Taking?   famotidine (PEPCID) 20 mg tablet   No No   Sig: Take 1 tablet (20 mg total) by mouth 2 (two) times a day as needed for indigestion or heartburn   fluocinonide (LIDEX) 0.05 % cream   Yes No   Sig: Apply topically   omeprazole (PriLOSEC) 20 mg delayed release capsule   Yes No   Sig: Take 20 mg by mouth every other day   Patient not taking: Reported on 9/27/2022      Facility-Administered Medications: None       Past Medical History:   Diagnosis Date   • COVID-19 virus infection 01/2021   • Psoriasis        Past Surgical History:   Procedure Laterality Date   • UNDESCENDED TESTICLE EXPLORATION         Family History   Problem Relation Age of Onset   • Hypertension Father    • Hypertension Brother    • Pancreatic cancer Maternal Grandmother    • Throat cancer Paternal Grandfather      I have reviewed and agree with the history as documented. E-Cigarette/Vaping     E-Cigarette/Vaping Substances     Social History     Tobacco Use   • Smoking status: Never   • Smokeless tobacco: Never   Substance Use Topics   • Alcohol use: Yes   • Drug use: Never       Review of Systems   Constitutional: Positive for appetite change. Negative for chills and fever. Eyes: Negative for visual disturbance. Respiratory: Negative. Negative for cough, chest tightness and shortness of breath. Cardiovascular: Negative. Negative for chest pain and leg swelling. Gastrointestinal: Positive for abdominal pain and nausea. Negative for blood in stool, diarrhea and vomiting. Endocrine: Positive for polydipsia. Genitourinary: Negative for dysuria. Musculoskeletal: Negative for back pain and neck pain. Skin: Negative for color change, pallor, rash and wound. Allergic/Immunologic: Negative for immunocompromised state. Neurological: Negative for dizziness, syncope and light-headedness. All other systems reviewed and are negative. Physical Exam  Physical Exam  Vitals and nursing note reviewed. Constitutional:       General: He is not in acute distress. Appearance: He is well-developed. He is not ill-appearing, toxic-appearing or diaphoretic. HENT:      Head: Normocephalic and atraumatic. Right Ear: External ear normal.      Left Ear: External ear normal.      Nose: No congestion. Eyes:      General: No scleral icterus. Conjunctiva/sclera: Conjunctivae normal.      Right eye: Right conjunctiva is not injected. Left eye: Left conjunctiva is not injected. Neck:      Trachea: No tracheal deviation. Cardiovascular:      Rate and Rhythm: Normal rate and regular rhythm. Pulses: Normal pulses. Pulmonary:      Effort: Pulmonary effort is normal. No respiratory distress. Breath sounds: No stridor. Abdominal:      Tenderness: There is abdominal tenderness in the right upper quadrant and epigastric area.  Positive signs include Cedillo's sign. Skin:     General: Skin is warm and dry. Capillary Refill: Capillary refill takes less than 2 seconds. Coloration: Skin is not pale. Findings: No erythema or rash. Neurological:      Mental Status: He is alert and oriented to person, place, and time. Motor: No abnormal muscle tone.    Psychiatric:         Mood and Affect: Mood normal.         Behavior: Behavior normal.         Vital Signs  ED Triage Vitals   Temperature Pulse Respirations Blood Pressure SpO2   07/14/23 0006 07/14/23 0006 07/14/23 0006 07/14/23 0006 07/14/23 0006   98.2 °F (36.8 °C) 84 17 140/98 100 %      Temp Source Heart Rate Source Patient Position - Orthostatic VS BP Location FiO2 (%)   07/14/23 0006 07/14/23 0006 07/14/23 0006 07/14/23 0006 --   Oral Monitor Lying Right arm       Pain Score       07/14/23 0050       7           Vitals:    07/14/23 0006 07/14/23 0200   BP: 140/98 123/86   Pulse: 84 70   Patient Position - Orthostatic VS: Lying Lying         Visual Acuity      ED Medications  Medications   lactated ringers bolus 1,000 mL (0 mL Intravenous Stopped 7/14/23 0233)   ondansetron (ZOFRAN) injection 4 mg (4 mg Intravenous Given 7/14/23 0047)   ketorolac (TORADOL) injection 15 mg (15 mg Intravenous Given 7/14/23 0050)       Diagnostic Studies  Results Reviewed     Procedure Component Value Units Date/Time    Comprehensive metabolic panel [[id number]] Collected: 07/14/23 0044    Lab Status: Final result Specimen: Blood from Arm, Left Updated: 07/14/23 0109     Sodium 136 mmol/L      Potassium 3.8 mmol/L      Chloride 102 mmol/L      CO2 21 mmol/L      ANION GAP 13 mmol/L      BUN 12 mg/dL      Creatinine 1.17 mg/dL      Glucose 127 mg/dL      Calcium 9.7 mg/dL      AST 18 U/L      ALT 19 U/L      Alkaline Phosphatase 74 U/L      Total Protein 8.3 g/dL      Albumin 5.0 g/dL      Total Bilirubin 0.78 mg/dL      eGFR 78 ml/min/1.73sq m     Narrative:      [facility] guidelines for Chronic Kidney Disease (CKD):   •  Stage 1 with normal or high GFR (GFR > 90 mL/min/1.73 square meters)  •  Stage 2 Mild CKD (GFR = 60-89 mL/min/1.73 square meters)  •  Stage 3A Moderate CKD (GFR = 45-59 mL/min/1.73 square meters)  •  Stage 3B Moderate CKD (GFR = 30-44 mL/min/1.73 square meters)  •  Stage 4 Severe CKD (GFR = 15-29 mL/min/1.73 square meters)  •  Stage 5 End Stage CKD (GFR <15 mL/min/1.73 square meters)  Note: GFR calculation is accurate only with a steady state creatinine    Lipase [[id number]]  (Normal) Collected: 07/14/23 0044    Lab Status: Final result Specimen: Blood from Arm, Left Updated: 07/14/23 0109     Lipase 37 u/L     CBC and differential [[id number]]  (Abnormal) Collected: 07/14/23 0044    Lab Status: Final result Specimen: Blood from Arm, Left Updated: 07/14/23 0050     WBC 14.52 Thousand/uL      RBC 5.48 Million/uL      Hemoglobin 16.5 g/dL      Hematocrit 47.7 %      MCV 87 fL      MCH 30.1 pg      MCHC 34.6 g/dL      RDW 12.7 %      MPV 12.7 fL      Platelets 757 Thousands/uL      nRBC 0 /100 WBCs      Neutrophils Relative 82 %      Immat GRANS % 0 %      Lymphocytes Relative 13 %      Monocytes Relative 5 %      Eosinophils Relative 0 %      Basophils Relative 0 %      Neutrophils Absolute 11.92 Thousands/µL      Immature Grans Absolute 0.05 Thousand/uL      Lymphocytes Absolute 1.82 Thousands/µL      Monocytes Absolute 0.66 Thousand/µL      Eosinophils Absolute 0.02 Thousand/µL      Basophils Absolute 0.05 Thousands/µL                  US right upper quadrant    (Results Pending)              Procedures  Procedures         ED Course                               SBIRT 22yo+    Flowsheet Row Most Recent Value   Initial Alcohol Screen: US AUDIT-C     1. How often do you have a drink containing alcohol? 0 Filed at: 07/14/2023 0008   2. How many drinks containing alcohol do you have on a typical day you are drinking? 0 Filed at: 07/14/2023 0008   3a. Male UNDER 65:  How often do you have five or more drinks on one occasion? 0 Filed at: 07/14/2023 0008   Audit-C Score 0 Filed at: 07/14/2023 0008   LYNN: How many times in the past year have you. .. Used an illegal drug or used a prescription medication for non-medical reasons? Never Filed at: 07/14/2023 0008                    Medical Decision Making  RUQ and epigastric pain x 7 hours. Pt appears in mild distress and discomfort, admits to history of similar symptoms in past 6 months in which he was recommended for outpatient cholecystomy and never scheduled at [facility].  Given his reoccurrence tonight we will order a CBC, CMP, and Lipase to further evaluate this. Unable to obtain an ultrasound at this time. His CT at [facility] did not show gallstones but his ultrasound did so I do not feel that a repeat CT is needed at this time unless he clinically declines or something changes. We will start treatment with Toradol, Zofran and IV fluids. Further decision making based on results of labs and imaging. 2:24 AM  Labs noted and d/w pt. mild leukocytosis but patient is afebrile and is overall feeling bettter. No RUQ pain on re-exam. Will po challenge for disposition    Patient tolerated liquids and crackers well without any reoccurrence of symptoms. Will discharge home with an ambulatory referral to surgery, a low-fat diet, supportive care with Tylenol, Motrin and Zofran along with strict return ER precautions. Patient understands and agrees with this plan of care. Questions and concerns answered. Amount and/or Complexity of Data Reviewed  Labs: ordered. Radiology: ordered. Risk  Prescription drug management.           Disposition  Final diagnoses:   Biliary colic   Nausea and vomiting     Time reflects when diagnosis was documented in both MDM as applicable and the Disposition within this note     Time User Action Codes Description Comment    7/14/2023  3:00 AM Marc Lyn Add [M83.50] Biliary colic 7/14/2023  3:00 AM Gregor Lyn Guardian Add [R11.2] Nausea and vomiting       ED Disposition     ED Disposition   Discharge    Condition   Stable    Date/Time   Fri Jul 14, 2023  2:58 AM    Comment   Terra Like discharge to home/self care. Follow-up Information     Follow up With Specialties Details Why Contact Info Additional [address] [facility] General Surgery Call today To schedule an appointment as soon as you can [address] [facility] [address] [facility] [phone]  [facility], [facility] [place], [place], [place], [facility] Emergency Department Emergency Medicine Go to  If symptoms worsen [address] [address]  [address] [facility] Emergency Department, [address] [facility], [place], [place], [address]          Discharge Medication List as of 7/14/2023  3:04 AM      START taking these medications    Details   ibuprofen (MOTRIN) 600 mg tablet Take 1 tablet (600 mg total) by mouth every 6 (six) hours as needed for mild pain or moderate pain, Starting Fri 7/14/2023, Normal      ondansetron (ZOFRAN-ODT) 4 mg disintegrating tablet Take 1 tablet (4 mg total) by mouth every 6 (six) hours as needed for nausea or vomiting, Starting Fri 7/14/2023, Normal         CONTINUE these medications which have NOT CHANGED    Details   famotidine (PEPCID) 20 mg tablet Take 1 tablet (20 mg total) by mouth 2 (two) times a day as needed for indigestion or heartburn, Starting Tue 9/27/2022, Normal      fluocinonide (LIDEX) 0.05 % cream Apply topically, Historical Med      omeprazole (PriLOSEC) 20 mg delayed release capsule Take 20 mg by mouth every other day, Historical Med             Outpatient Discharge Orders   US right upper quadrant   Standing Status: Future Standing Exp.  Date: 07/14/27       PDMP Review     None          ED Provider  Electronically Signed by           Emily Petty., DO  07/14/23 8645

## 2023-07-14 NOTE — ED NOTES
Pt provided with ginger ale and lee yoon at this time.       Mal Roman, [address] [place] [address]  07/14/23 8636

## 2023-08-10 ENCOUNTER — OFFICE VISIT (OUTPATIENT)
Dept: SURGERY | Facility: CLINIC | Age: 38
End: 2023-08-10
Payer: COMMERCIAL

## 2023-08-10 VITALS
SYSTOLIC BLOOD PRESSURE: 126 MMHG | TEMPERATURE: 98 F | WEIGHT: 148.6 LBS | HEIGHT: 64 IN | DIASTOLIC BLOOD PRESSURE: 82 MMHG | HEART RATE: 94 BPM | BODY MASS INDEX: 25.37 KG/M2

## 2023-08-10 DIAGNOSIS — D68.2 FACTOR V DEFICIENCY (HCC): ICD-10-CM

## 2023-08-10 DIAGNOSIS — K80.50 BILIARY COLIC: Primary | ICD-10-CM

## 2023-08-10 DIAGNOSIS — R11.2 NAUSEA AND VOMITING: ICD-10-CM

## 2023-08-10 DIAGNOSIS — K80.20 CHOLELITHIASIS: ICD-10-CM

## 2023-08-10 PROCEDURE — 99244 OFF/OP CNSLTJ NEW/EST MOD 40: CPT | Performed by: SURGERY

## 2023-08-10 NOTE — PROGRESS NOTES
Chief Complaint   Patient presents with   • Cholelithiasis     Patient has gallstones. He had an ultrasound done. No pain. Patient has been staying away from [address]. Princess Patel is a 42 y.o.male who is here for :    Chief Complaint   Patient presents with   • Cholelithiasis     Patient has gallstones. He had an ultrasound done. No pain. Patient has been staying away from [address]. Seen twice in the ER in the last few months for biliary colic. Has seen GI for hiatal hernia with Schatzki's ring  Currently: Cholelithiasis on April ultrasound      Assessment/Plan:   Princess Patel is a 42 y.o.male who is here for possible Gallbladder disease. Upon evaluation today patient has: Chronic Cholecystitis    . Plan: 1. Matias colic recurrent symptoms with increasing frequency and severity laparoscopic Cholecystectomy with possible Intraoperative Cholangiogram  Possible Robotic assisted      Post Op Pain Management: Norco      Ultrasound findings:   Signed On: 4/26/2023 9:42 PM EDT  Impression    Cholelithiasis with minimal gallbladder wall thickening. The pericholecystic fat stranding on CT abdomen pelvis today appreciable on this examination. Consider chronic cholecystitis. If there is clinical concern for acute cholecystitis, consider HIDA   scan. 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 Needs to see hematology preop. 3.  3 cm hiatal hernia with Schatzki's ring and symptomatic reflux which is running in his family. He has a very reasonable BMI 69 kg. He might benefit from surgical intervention and we have discussed surgical referral for this to our partner Dr. Laz Hernandez. Asif testing  Upper GI  EGD all confirm the diagnosis of a small to moderate hiatal hernia with consistent reflux to the mid thoracic esophagus. Body mass index is 25.51 kg/m².      Preoperative Clearance: Hematology consult for known new factor V deficiency discovered when genetic testing was done for his child    In preparation for this visit all relevant and known prior office notes, prior consultations, emergency room visits, blood work results, and imaging studies were personally reviewed. A total of  45 minutes was spent reviewing all of this information,caring for this patient, providing differential diagnosis, instructions for management, counseling and coordination of care. This also includes planning surgical intervention where indicated. Images:       Patient understands hernia occurrence or re-occurrence risk is higher in a diabetic, tobacco user, with elevated BMIs.   ____________________________________________________    HPI:  Abiel Treviño is a 42 y.o.male who was referred for evaluation of The primary encounter diagnosis was Biliary colic. Diagnoses of Nausea and vomiting, Factor V deficiency ([address]), and Cholelithiasis were also pertinent to this visit. Abdominal pain Location: in the RUQ without radiation, which is Intermittent  and over 6 months     no nausea and no vomiting,     regular bowel movement.      Duration of pain or symptoms: over 6 months and no pain       Prior Colonoscopy : None     Prior Abdominal Surgery:     Anticoagulation: none    A1c:     Smoking Status: Non-smoker     Imaging:   No orders to display           LABS:    Lab Results   Component Value Date    K 3.8 07/14/2023     07/14/2023    CO2 21 07/14/2023    BUN 12 07/14/2023    CREATININE 1.17 07/14/2023    GLUF 101 (H) 04/12/2022    CALCIUM 9.7 07/14/2023    AST 18 07/14/2023    ALT 19 07/14/2023    ALKPHOS 74 07/14/2023    EGFR 78 07/14/2023       Lab Results   Component Value Date    WBC 14.52 (H) 07/14/2023    HGB 16.5 07/14/2023    HCT 47.7 07/14/2023    MCV 87 07/14/2023     07/14/2023     No results found for: "INR", "PROTIME"  Lab Results   Component Value Date    HGBA1C 5.6 04/12/2022           ROS:  General ROS: negative for - chills, fatigue, fever or night sweats, weight loss  Respiratory ROS: no cough, shortness of breath, or wheezing  Cardiovascular ROS: no chest pain or dyspnea on exertion  Genito-Urinary ROS: no dysuria, trouble voiding, or hematuria  Musculoskeletal ROS: negative for - gait disturbance, joint pain or muscle pain  Neurological ROS: no TIA or stroke symptoms  Gastrointestinal ROS: See HPI   GI ROS: see HPI  Skin ROS: no new rashes or lesions   Lymphatic ROS: no new adenopathy noted by pt. GYN ROS: see HPI, no new GYN history or bleeding noted  Psy ROS: no new mental or behavioral disturbances       There is no problem list on file for this patient.         Allergies:  Cefaclor      Current Outpatient Medications:   •  ibuprofen (MOTRIN) 600 mg tablet, Take 1 tablet (600 mg total) by mouth every 6 (six) hours as needed for mild pain or moderate pain, Disp: 30 tablet, Rfl: 0  •  famotidine (PEPCID) 20 mg tablet, Take 1 tablet (20 mg total) by mouth 2 (two) times a day as needed for indigestion or heartburn (Patient not taking: Reported on 8/10/2023), Disp: 60 tablet, Rfl: 11  •  fluocinonide (LIDEX) 0.05 % cream, Apply topically (Patient not taking: Reported on 8/10/2023), Disp: , Rfl:   •  omeprazole (PriLOSEC) 20 mg delayed release capsule, Take 20 mg by mouth every other day (Patient not taking: Reported on 9/27/2022), Disp: , Rfl:   •  ondansetron (ZOFRAN-ODT) 4 mg disintegrating tablet, Take 1 tablet (4 mg total) by mouth every 6 (six) hours as needed for nausea or vomiting (Patient not taking: Reported on 8/10/2023), Disp: 20 tablet, Rfl: 0    Past Medical History:   Diagnosis Date   • COVID-19 virus infection 01/2021   • Psoriasis        Past Surgical History:   Procedure Laterality Date   • UNDESCENDED TESTICLE EXPLORATION         Family History   Problem Relation Age of Onset   • Hypertension Father    • Hypertension Brother    • Pancreatic cancer Maternal Grandmother    • Throat cancer Paternal Grandfather         reports that he has never smoked. He has never used smokeless tobacco. He reports current alcohol use. He reports that he does not use drugs. Exam:   Vitals:    08/10/23 1433   BP: 126/82   Pulse: 94   Temp: 98 °F (36.7 °C)       PHYSICAL EXAM  General Appearance:    Alert, cooperative, no distress,    Head:    Normocephalic without obvious abnormality   Eyes:    PERRL, conjunctiva/corneas clear,       Neck:   Supple, no adenopathy, no JVD   Back:     Symmetric, no spinal or CVA tenderness   Lungs:     Clear to auscultation bilaterally, no wheezing or rhonchi   Heart:    Regular rate and rhythm, S1 and S2 normal, no murmur   Abdomen:     abdomen is soft without significant tenderness, masses, organomegaly or guarding Bowel sounds are normal.     Extremities:   Extremities normal. No clubbing, cyanosis or edema   Psych:   Normal Affect, AOx3. Neurologic:  Skin:   CNII-XII intact. Strength symmetric, speech intact    Warm, dry, intact, no visible rashes or lesions      Informed consent for procedure was personally discussed, reviewed, and signed by Dr. Nikki Claire. Discussion by Dr. Nikki Claire was carried out regarding risks, benefits, and alternatives with the patient. Risks include but are not limited to:  bleeding, infection, and delayed wound healing or an open wound, pulmonary embolus, leaks from bowel or bile ducts or other viscus, transfusions, death. Discussed in further detail the more common complications and their rates of occurrence.  was used if necessary. Patient expressed understanding of the issues discussed and wished/consented to proceed. All questions were answered by Dr. Nikki Claire. Discussion performed between patient and the provider signing below. Signature:   Xin Ambrosio MD    Date: 8/10/2023 Time: 2:56 PM                          Some portions of this record may have been generated with voice recognition software.  There may be translation, syntax,  or grammatical errors. Occasional wrong word or "sound-a-like" substitutions may have occurred due to the inherent limitations of the voice recognition software. Read the chart carefully and recognize, using context, where substitutions may have occurred. If you have any questions, please contact the dictating provider for clarification or correction, as needed. This encounter has been coded by a non-certified coder.

## 2023-08-11 ENCOUNTER — TELEPHONE (OUTPATIENT)
Dept: HEMATOLOGY ONCOLOGY | Facility: CLINIC | Age: 38
End: 2023-08-11

## 2023-08-11 NOTE — TELEPHONE ENCOUNTER
I called Aaron Mayo in response to a referral that was received for patient to establish care with Hematology. Outreach was made to schedule a consultation. I left a voicemail explaining the reason for my call and advised patient to call [facility] at [phone]. Another attempt will be made to contact patient.

## 2023-09-12 ENCOUNTER — CONSULT (OUTPATIENT)
Dept: HEMATOLOGY ONCOLOGY | Facility: CLINIC | Age: 38
End: 2023-09-12
Payer: COMMERCIAL

## 2023-09-12 VITALS
HEART RATE: 78 BPM | OXYGEN SATURATION: 97 % | DIASTOLIC BLOOD PRESSURE: 76 MMHG | RESPIRATION RATE: 17 BRPM | SYSTOLIC BLOOD PRESSURE: 106 MMHG | BODY MASS INDEX: 24.48 KG/M2 | WEIGHT: 143.4 LBS | HEIGHT: 64 IN | TEMPERATURE: 97.3 F

## 2023-09-12 DIAGNOSIS — K80.50 BILIARY COLIC: ICD-10-CM

## 2023-09-12 DIAGNOSIS — D68.51 HETEROZYGOUS FACTOR V LEIDEN MUTATION (HCC): Primary | ICD-10-CM

## 2023-09-12 DIAGNOSIS — Z14.8 CARRIER OF HEMOCHROMATOSIS HFE GENE MUTATION: ICD-10-CM

## 2023-09-12 PROBLEM — D68.2 FACTOR V DEFICIENCY (HCC): Status: ACTIVE | Noted: 2023-09-12

## 2023-09-12 PROCEDURE — 99212 OFFICE O/P EST SF 10 MIN: CPT | Performed by: INTERNAL MEDICINE

## 2023-09-12 NOTE — PROGRESS NOTES
Oncology Consult Note  Fabiola Ojeda 42 y.o. male MRN: [id number]  Unit/Bed#:  Encounter: [id number]      Presenting Complaint: Cancer 5 Leiden mutation, homozygous, carrier of hemochromatosis    History of Presenting Illness: Fabiola Ojeda is seen for initial consultation 9/12/2023 at the referral of Margo Zaragoza MD   68-year-old  male who is scheduled for elective cholecystectomy because of cholelithiasis, the patient did cancer screening tests by [facility] and was found to have heterozygous factor V Leiden mutation, heterozygous for hemochromatosis    He never had any previous history of thrombosis    He does not smoke he drinks alcohol occasionally    Review of system is normal  Review of Systems - As stated in the HPI otherwise the fourteen point review of systems was negative. Past Medical History:   Diagnosis Date   • COVID-19 virus infection 01/2021   • Psoriasis        Social History     Socioeconomic History   • Marital status: Single     Spouse name: Not on file   • Number of children: Not on file   • Years of education: Not on file   • Highest education level: Not on file   Occupational History   • Not on file   Tobacco Use   • Smoking status: Never   • Smokeless tobacco: Never   Vaping Use   • Vaping Use: Never used   Substance and Sexual Activity   • Alcohol use:  Yes   • Drug use: Never   • Sexual activity: Yes     Partners: Female   Other Topics Concern   • Not on file   Social History Narrative   • Not on file     Social Determinants of Health     Financial Resource Strain: Not on file   Food Insecurity: Not on file   Transportation Needs: Not on file   Physical Activity: Not on file   Stress: Not on file   Social Connections: Not on file   Intimate Partner Violence: Not on file   Housing Stability: Not on file       Family History   Problem Relation Age of Onset   • Hypertension Father    • Hypertension Brother    • Pancreatic cancer Maternal Grandmother    • Throat cancer Paternal Grandfather        Allergies   Allergen Reactions   • Cefaclor Rash and Edema         Current Outpatient Medications:   •  ibuprofen (MOTRIN) 600 mg tablet, Take 1 tablet (600 mg total) by mouth every 6 (six) hours as needed for mild pain or moderate pain, Disp: 30 tablet, Rfl: 0  •  omeprazole (PriLOSEC) 20 mg delayed release capsule, Take 20 mg by mouth every other day, Disp: , Rfl:   •  famotidine (PEPCID) 20 mg tablet, Take 1 tablet (20 mg total) by mouth 2 (two) times a day as needed for indigestion or heartburn (Patient not taking: Reported on 8/10/2023), Disp: 60 tablet, Rfl: 11  •  fluocinonide (LIDEX) 0.05 % cream, Apply topically (Patient not taking: Reported on 8/10/2023), Disp: , Rfl:   •  ondansetron (ZOFRAN-ODT) 4 mg disintegrating tablet, Take 1 tablet (4 mg total) by mouth every 6 (six) hours as needed for nausea or vomiting (Patient not taking: Reported on 8/10/2023), Disp: 20 tablet, Rfl: 0      /76 (BP Location: Left arm, Patient Position: Sitting, Cuff Size: Adult)   Pulse 78   Temp (!) 97.3 °F (36.3 °C) (Temporal)   Resp 17   Ht 5' 4" (1.626 m)   Wt 65 kg (143 lb 6.4 oz)   SpO2 97%   BMI 24.61 kg/m²       General Appearance:    Alert, oriented        Eyes:    PERRL   Ears:    Normal external ear canals, both ears   Nose:   Nares normal, septum midline   Throat:   Mucosa moist. Pharynx without injection. Neck:   Supple       Lungs:     Clear to auscultation bilaterally   Chest Wall:    No tenderness or deformity    Heart:    Regular rate and rhythm       Abdomen:     Soft, non-tender, bowel sounds +, no organomegaly           Extremities:   Extremities no cyanosis or edema       Skin:   no rash or icterus. Lymph nodes:   Cervical, supraclavicular, and axillary nodes normal   Neurologic:   CNII-XII intact, normal strength, sensation and reflexes     Throughout               No results found for this or any previous visit (from the past 48 hour(s)). No results found.   ECOG :0      Assessment and plan:    #1. Factor V Leiden mutation, heterozygous  2. Hemochromatosis carrier    3.   Scheduled for elective cholecystectomy, proceed as general population no need for anticoagulation unless if he is staying inpatient

## 2023-10-19 DIAGNOSIS — K44.9 HIATAL HERNIA WITHOUT GANGRENE OR OBSTRUCTION: Primary | ICD-10-CM

## 2023-11-09 NOTE — PROGRESS NOTES
Chief Complaint   Patient presents with   • Abdominal Pain     Patient is here today to discuss gallbladder surgery        Darlene De is a 34 y.o.male who is here for :    Chief Complaint   Patient presents with   • Abdominal Pain     Patient is here today to discuss gallbladder surgery    Seen twice in the ER in the last few months for biliary colic. Has seen GI for hiatal hernia with Schatzki's ring  Currently: Cholelithiasis on April ultrasound      Assessment/Plan:   Darlene De is a 34 y.o.male who is here for possible Gallbladder disease. Upon evaluation today patient has: Chronic Cholecystitis    . Plan: 1. Bilary colic recurrent symptoms with increasing frequency and severity laparoscopic Cholecystectomy with possible Intraoperative Cholangiogram  Possible Robotic assisted      Post Op Pain Management: [facility]      Ultrasound findings:   Signed On: 4/26/2023 9:42 PM EDT  Impression    Cholelithiasis with minimal gallbladder wall thickening. The pericholecystic fat stranding on CT abdomen pelvis today appreciable on this examination. Consider chronic cholecystitis. If there is clinical concern for acute cholecystitis, consider HIDA   scan. Signed By: Jason Casey DO    Signed On: 4/26/2023 7:39 PM EDT    2. 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 Needs to see hematology preop. ####Cleared for surgery without any need for anticoagulation unless he stays inpatient. No other perioperative management needed by oncology per their note on 9/12/2023. 3.  3 cm hiatal hernia with Schatzki's ring and symptomatic reflux which is running in his family. He has a very reasonable BMI 24 . He might benefit from surgical intervention and we have discussed surgical referral for this to our partner Dr. Vega Carreno. Asif testing  Upper GI  EGD all confirm the diagnosis of a small to moderate hiatal hernia with consistent reflux to the mid thoracic esophagus.      Body mass index is 24.2 kg/m². Preoperative Clearance: Hematology consult for known new factor V deficiency discovered when genetic testing was done for his child    In preparation for this visit all relevant and known prior office notes, prior consultations, emergency room visits, blood work results, and imaging studies were personally reviewed. A total of  45 minutes was spent reviewing all of this information,caring for this patient, providing differential diagnosis, instructions for management, counseling and coordination of care. This also includes planning surgical intervention where indicated. Images:       Patient understands hernia occurrence or re-occurrence risk is higher in a diabetic, tobacco user, with elevated BMIs.   ____________________________________________________    HPI:  Jamie Benavides is a 34 y.o.male who was referred for evaluation of The primary encounter diagnosis was Calculus of gallbladder and bile duct without cholecystitis or obstruction. Diagnoses of Hiatal hernia without gangrene or obstruction, Heterozygous factor V Leiden mutation ([address]), and Factor V deficiency ([address]) were also pertinent to this visit. Abdominal pain Location: in the RUQ without radiation, which is Intermittent  and over 6 months     no nausea and no vomiting,     regular bowel movement.      Duration of pain or symptoms: over 6 months and no pain       Prior Colonoscopy : None     Prior Abdominal Surgery:     Anticoagulation: none    A1c:     Smoking Status: Non-smoker     Imaging:   No orders to display           LABS:    Lab Results   Component Value Date    K 3.8 07/14/2023     07/14/2023    CO2 21 07/14/2023    BUN 12 07/14/2023    CREATININE 1.17 07/14/2023    GLUF 101 (H) 04/12/2022    CALCIUM 9.7 07/14/2023    AST 18 07/14/2023    ALT 19 07/14/2023    ALKPHOS 74 07/14/2023    EGFR 78 07/14/2023       Lab Results   Component Value Date    WBC 14.52 (H) 07/14/2023    HGB 16.5 07/14/2023    HCT 47.7 07/14/2023 MCV 87 07/14/2023     07/14/2023     No results found for: "INR", "PROTIME"  Lab Results   Component Value Date    HGBA1C 5.6 04/12/2022           ROS:  General ROS: negative for - chills, fatigue, fever or night sweats, weight loss  Respiratory ROS: no cough, shortness of breath, or wheezing  Cardiovascular ROS: no chest pain or dyspnea on exertion  Genito-Urinary ROS: no dysuria, trouble voiding, or hematuria  Musculoskeletal ROS: negative for - gait disturbance, joint pain or muscle pain  Neurological ROS: no TIA or stroke symptoms  Gastrointestinal ROS: See HPI   GI ROS: see HPI  Skin ROS: no new rashes or lesions   Lymphatic ROS: no new adenopathy noted by pt. GYN ROS: see HPI, no new GYN history or bleeding noted  Psy ROS: no new mental or behavioral disturbances       Patient Active Problem List   Diagnosis   • Factor V deficiency ([address])   • Heterozygous factor V Leiden mutation ([address])   • Carrier of hemochromatosis HFE gene mutation         Allergies:  Cefaclor      Current Outpatient Medications:   •  omeprazole (PriLOSEC) 20 mg delayed release capsule, Take 20 mg by mouth every other day, Disp: , Rfl:   •  ibuprofen (MOTRIN) 600 mg tablet, Take 1 tablet (600 mg total) by mouth every 6 (six) hours as needed for mild pain or moderate pain (Patient not taking: Reported on 11/16/2023), Disp: 30 tablet, Rfl: 0    Past Medical History:   Diagnosis Date   • COVID-19 virus infection 01/2021   • Psoriasis        Past Surgical History:   Procedure Laterality Date   • UNDESCENDED TESTICLE EXPLORATION         Family History   Problem Relation Age of Onset   • Hypertension Father    • Hypertension Brother    • Pancreatic cancer Maternal Grandmother    • Throat cancer Paternal Grandfather         reports that he has never smoked. He has never been exposed to tobacco smoke. He has never used smokeless tobacco. He reports current alcohol use of about 1.0 standard drink of alcohol per week.  He reports that he does not use drugs. Exam:   Vitals:    11/16/23 1355   BP: 117/76   Pulse: 99   Resp: 16   Temp: (!) 97 °F (36.1 °C)   SpO2: 99%         PHYSICAL EXAM  General Appearance:    Alert, cooperative, no distress,    Head:    Normocephalic without obvious abnormality   Eyes:    PERRL, conjunctiva/corneas clear,       Neck:   Supple, no adenopathy, no JVD   Back:     Symmetric, no spinal or CVA tenderness   Lungs:     Clear to auscultation bilaterally, no wheezing or rhonchi   Heart:    Regular rate and rhythm, S1 and S2 normal, no murmur   Abdomen:     abdomen is soft without significant tenderness, masses, organomegaly or guarding Bowel sounds are normal.     Extremities:   Extremities normal. No clubbing, cyanosis or edema   Psych:   Normal Affect, AOx3. Neurologic:  Skin:   CNII-XII intact. Strength symmetric, speech intact    Warm, dry, intact, no visible rashes or lesions      Informed consent for procedure was personally discussed, reviewed, and signed by Dr. Reynaldo Saenz. Discussion by Dr. Reynaldo Saenz was carried out regarding risks, benefits, and alternatives with the patient. Risks include but are not limited to:  bleeding, infection, and delayed wound healing or an open wound, pulmonary embolus, leaks from bowel or bile ducts or other viscus, transfusions, death. Discussed in further detail the more common complications and their rates of occurrence.  was used if necessary. Patient expressed understanding of the issues discussed and wished/consented to proceed. All questions were answered by Dr. Reynaldo Saenz. Discussion performed between patient and the provider signing below. Signature:   Lalit Ledesma MD    Date: 11/16/2023 Time: 2:27 PM                          Some portions of this record may have been generated with voice recognition software. There may be translation, syntax,  or grammatical errors.  Occasional wrong word or "sound-a-like" substitutions may have occurred due to the inherent limitations of the voice recognition software. Read the chart carefully and recognize, using context, where substitutions may have occurred. If you have any questions, please contact the dictating provider for clarification or correction, as needed. This encounter has been coded by a non-certified coder.

## 2023-11-16 ENCOUNTER — OFFICE VISIT (OUTPATIENT)
Dept: SURGERY | Facility: CLINIC | Age: 38
End: 2023-11-16
Payer: COMMERCIAL

## 2023-11-16 VITALS
TEMPERATURE: 97 F | WEIGHT: 141 LBS | DIASTOLIC BLOOD PRESSURE: 76 MMHG | HEIGHT: 64 IN | BODY MASS INDEX: 24.07 KG/M2 | RESPIRATION RATE: 16 BRPM | SYSTOLIC BLOOD PRESSURE: 117 MMHG | HEART RATE: 99 BPM | OXYGEN SATURATION: 99 %

## 2023-11-16 DIAGNOSIS — K80.70 CALCULUS OF GALLBLADDER AND BILE DUCT WITHOUT CHOLECYSTITIS OR OBSTRUCTION: Primary | ICD-10-CM

## 2023-11-16 DIAGNOSIS — D68.2 FACTOR V DEFICIENCY (HCC): ICD-10-CM

## 2023-11-16 DIAGNOSIS — K44.9 HIATAL HERNIA WITHOUT GANGRENE OR OBSTRUCTION: ICD-10-CM

## 2023-11-16 DIAGNOSIS — D68.51 HETEROZYGOUS FACTOR V LEIDEN MUTATION (HCC): ICD-10-CM

## 2023-11-16 PROCEDURE — 99214 OFFICE O/P EST MOD 30 MIN: CPT | Performed by: SURGERY

## 2023-12-18 ENCOUNTER — APPOINTMENT (OUTPATIENT)
Dept: LAB | Facility: CLINIC | Age: 38
End: 2023-12-18
Payer: COMMERCIAL

## 2023-12-18 DIAGNOSIS — K80.20 CALCULUS OF GALLBLADDER WITHOUT CHOLECYSTITIS WITHOUT OBSTRUCTION: Primary | ICD-10-CM

## 2023-12-18 DIAGNOSIS — K80.20 CALCULUS OF GALLBLADDER WITHOUT CHOLECYSTITIS WITHOUT OBSTRUCTION: ICD-10-CM

## 2023-12-18 LAB
ALBUMIN SERPL BCP-MCNC: 4.7 G/DL (ref 3.5–5)
ALP SERPL-CCNC: 92 U/L (ref 34–104)
ALT SERPL W P-5'-P-CCNC: 19 U/L (ref 7–52)
AST SERPL W P-5'-P-CCNC: 24 U/L (ref 13–39)
BILIRUB DIRECT SERPL-MCNC: 0.1 MG/DL (ref 0–0.2)
BILIRUB SERPL-MCNC: 0.5 MG/DL (ref 0.2–1)
PROT SERPL-MCNC: 7.7 G/DL (ref 6.4–8.4)

## 2023-12-18 PROCEDURE — 80076 HEPATIC FUNCTION PANEL: CPT

## 2023-12-18 PROCEDURE — 36415 COLL VENOUS BLD VENIPUNCTURE: CPT

## 2023-12-19 ENCOUNTER — ANESTHESIA EVENT (OUTPATIENT)
Dept: PERIOP | Facility: HOSPITAL | Age: 38
End: 2023-12-19
Payer: COMMERCIAL

## 2023-12-19 NOTE — ANESTHESIA PREPROCEDURE EVALUATION
Procedure:  CHOLECYSTECTOMY  W/ ROBOT (Abdomen)    Relevant Problems   ANESTHESIA (within normal limits)      CARDIO (within normal limits)      ENDO (within normal limits)      GI/HEPATIC  Choledocholithiasus       /RENAL (within normal limits)      HEMATOLOGY   (+) Factor V deficiency (HCC)      NEURO/PSYCH (within normal limits)      PULMONARY (within normal limits)        Physical Exam    Airway    Mallampati score: II         Dental       Cardiovascular  Cardiovascular exam normal    Pulmonary  Pulmonary exam normal     Other Findings        Anesthesia Plan  ASA Score- 2     Anesthesia Type- general with ASA Monitors.         Additional Monitors:     Airway Plan: ETT.           Plan Factors-    Chart reviewed.   Existing labs reviewed. Patient summary reviewed.    Patient is not a current smoker.              Induction- intravenous.    Postoperative Plan-     Informed Consent- Anesthetic plan and risks discussed with patient.

## 2023-12-20 ENCOUNTER — ANESTHESIA (OUTPATIENT)
Dept: PERIOP | Facility: HOSPITAL | Age: 38
End: 2023-12-20
Payer: COMMERCIAL

## 2023-12-20 ENCOUNTER — APPOINTMENT (OUTPATIENT)
Dept: RADIOLOGY | Facility: HOSPITAL | Age: 38
End: 2023-12-20
Payer: COMMERCIAL

## 2023-12-20 ENCOUNTER — HOSPITAL ENCOUNTER (OUTPATIENT)
Facility: HOSPITAL | Age: 38
Setting detail: OUTPATIENT SURGERY
Discharge: HOME | End: 2023-12-20
Attending: SURGERY | Admitting: SURGERY
Payer: COMMERCIAL

## 2023-12-20 VITALS
WEIGHT: 135.58 LBS | HEART RATE: 75 BPM | OXYGEN SATURATION: 99 % | TEMPERATURE: 98.1 F | HEIGHT: 64 IN | SYSTOLIC BLOOD PRESSURE: 114 MMHG | DIASTOLIC BLOOD PRESSURE: 87 MMHG | BODY MASS INDEX: 23.15 KG/M2 | RESPIRATION RATE: 16 BRPM

## 2023-12-20 DIAGNOSIS — K80.70: ICD-10-CM

## 2023-12-20 DIAGNOSIS — K80.70 CALCULUS OF GALLBLADDER AND BILE DUCT WITHOUT CHOLECYSTITIS OR OBSTRUCTION: Primary | ICD-10-CM

## 2023-12-20 PROCEDURE — 47562 LAPAROSCOPIC CHOLECYSTECTOMY: CPT | Performed by: PHYSICIAN ASSISTANT

## 2023-12-20 PROCEDURE — 47562 LAPAROSCOPIC CHOLECYSTECTOMY: CPT | Performed by: SURGERY

## 2023-12-20 PROCEDURE — 99204 OFFICE O/P NEW MOD 45 MIN: CPT | Performed by: SURGERY

## 2023-12-20 PROCEDURE — 88304 TISSUE EXAM BY PATHOLOGIST: CPT | Performed by: PATHOLOGY

## 2023-12-20 PROCEDURE — S2900 ROBOTIC SURGICAL SYSTEM: HCPCS | Performed by: SURGERY

## 2023-12-20 RX ORDER — ACETAMINOPHEN 325 MG/1
975 TABLET ORAL EVERY 6 HOURS PRN
Status: DISCONTINUED | OUTPATIENT
Start: 2023-12-20 | End: 2023-12-20 | Stop reason: HOSPADM

## 2023-12-20 RX ORDER — SODIUM CHLORIDE 9 MG/ML
125 INJECTION, SOLUTION INTRAVENOUS CONTINUOUS
Status: DISCONTINUED | OUTPATIENT
Start: 2023-12-20 | End: 2023-12-20 | Stop reason: HOSPADM

## 2023-12-20 RX ORDER — CEFAZOLIN SODIUM 1 G/50ML
1000 SOLUTION INTRAVENOUS ONCE
Status: COMPLETED | OUTPATIENT
Start: 2023-12-20 | End: 2023-12-20

## 2023-12-20 RX ORDER — MIDAZOLAM HYDROCHLORIDE 2 MG/2ML
INJECTION, SOLUTION INTRAMUSCULAR; INTRAVENOUS AS NEEDED
Status: DISCONTINUED | OUTPATIENT
Start: 2023-12-20 | End: 2023-12-20

## 2023-12-20 RX ORDER — PROPOFOL 10 MG/ML
INJECTION, EMULSION INTRAVENOUS CONTINUOUS PRN
Status: DISCONTINUED | OUTPATIENT
Start: 2023-12-20 | End: 2023-12-20

## 2023-12-20 RX ORDER — HYDROMORPHONE HCL/PF 1 MG/ML
0.5 SYRINGE (ML) INJECTION
Status: DISCONTINUED | OUTPATIENT
Start: 2023-12-20 | End: 2023-12-20 | Stop reason: HOSPADM

## 2023-12-20 RX ORDER — ONDANSETRON 2 MG/ML
INJECTION INTRAMUSCULAR; INTRAVENOUS AS NEEDED
Status: DISCONTINUED | OUTPATIENT
Start: 2023-12-20 | End: 2023-12-20

## 2023-12-20 RX ORDER — HYDROMORPHONE HCL/PF 1 MG/ML
SYRINGE (ML) INJECTION AS NEEDED
Status: DISCONTINUED | OUTPATIENT
Start: 2023-12-20 | End: 2023-12-20

## 2023-12-20 RX ORDER — ROCURONIUM BROMIDE 10 MG/ML
INJECTION, SOLUTION INTRAVENOUS AS NEEDED
Status: DISCONTINUED | OUTPATIENT
Start: 2023-12-20 | End: 2023-12-20

## 2023-12-20 RX ORDER — MAGNESIUM HYDROXIDE 1200 MG/15ML
LIQUID ORAL AS NEEDED
Status: DISCONTINUED | OUTPATIENT
Start: 2023-12-20 | End: 2023-12-20 | Stop reason: HOSPADM

## 2023-12-20 RX ORDER — HYDROCODONE BITARTRATE AND ACETAMINOPHEN 5; 325 MG/1; MG/1
1 TABLET ORAL EVERY 6 HOURS PRN
Qty: 6 TABLET | Refills: 0 | Status: SHIPPED | OUTPATIENT
Start: 2023-12-20 | End: 2023-12-20

## 2023-12-20 RX ORDER — LIDOCAINE HYDROCHLORIDE 20 MG/ML
INJECTION, SOLUTION EPIDURAL; INFILTRATION; INTRACAUDAL; PERINEURAL AS NEEDED
Status: DISCONTINUED | OUTPATIENT
Start: 2023-12-20 | End: 2023-12-20

## 2023-12-20 RX ORDER — FENTANYL CITRATE/PF 50 MCG/ML
50 SYRINGE (ML) INJECTION
Status: DISCONTINUED | OUTPATIENT
Start: 2023-12-20 | End: 2023-12-20 | Stop reason: HOSPADM

## 2023-12-20 RX ORDER — INDOCYANINE GREEN AND WATER 25 MG
25 KIT INJECTION ONCE
Status: COMPLETED | OUTPATIENT
Start: 2023-12-20 | End: 2023-12-20

## 2023-12-20 RX ORDER — OXYCODONE HYDROCHLORIDE 5 MG/1
5 TABLET ORAL EVERY 4 HOURS PRN
Status: DISCONTINUED | OUTPATIENT
Start: 2023-12-20 | End: 2023-12-20 | Stop reason: HOSPADM

## 2023-12-20 RX ORDER — SODIUM CHLORIDE, SODIUM LACTATE, POTASSIUM CHLORIDE, CALCIUM CHLORIDE 600; 310; 30; 20 MG/100ML; MG/100ML; MG/100ML; MG/100ML
INJECTION, SOLUTION INTRAVENOUS CONTINUOUS PRN
Status: DISCONTINUED | OUTPATIENT
Start: 2023-12-20 | End: 2023-12-20

## 2023-12-20 RX ORDER — SODIUM CHLORIDE 9 MG/ML
INJECTION, SOLUTION INTRAVENOUS AS NEEDED
Status: DISCONTINUED | OUTPATIENT
Start: 2023-12-20 | End: 2023-12-20 | Stop reason: HOSPADM

## 2023-12-20 RX ORDER — ONDANSETRON 4 MG/1
4 TABLET, ORALLY DISINTEGRATING ORAL EVERY 6 HOURS PRN
Qty: 8 TABLET | Refills: 0 | Status: SHIPPED | OUTPATIENT
Start: 2023-12-20 | End: 2024-01-04 | Stop reason: ALTCHOICE

## 2023-12-20 RX ORDER — DEXAMETHASONE SODIUM PHOSPHATE 10 MG/ML
INJECTION, SOLUTION INTRAMUSCULAR; INTRAVENOUS AS NEEDED
Status: DISCONTINUED | OUTPATIENT
Start: 2023-12-20 | End: 2023-12-20

## 2023-12-20 RX ORDER — HYDROCODONE BITARTRATE AND ACETAMINOPHEN 5; 325 MG/1; MG/1
1 TABLET ORAL EVERY 6 HOURS PRN
Qty: 6 TABLET | Refills: 0 | Status: SHIPPED | OUTPATIENT
Start: 2023-12-20 | End: 2024-01-04 | Stop reason: ALTCHOICE

## 2023-12-20 RX ORDER — OXYCODONE HYDROCHLORIDE 10 MG/1
10 TABLET ORAL EVERY 4 HOURS PRN
Status: DISCONTINUED | OUTPATIENT
Start: 2023-12-20 | End: 2023-12-20 | Stop reason: HOSPADM

## 2023-12-20 RX ORDER — ONDANSETRON 2 MG/ML
4 INJECTION INTRAMUSCULAR; INTRAVENOUS ONCE AS NEEDED
Status: COMPLETED | OUTPATIENT
Start: 2023-12-20 | End: 2023-12-20

## 2023-12-20 RX ORDER — FENTANYL CITRATE 50 UG/ML
INJECTION, SOLUTION INTRAMUSCULAR; INTRAVENOUS AS NEEDED
Status: DISCONTINUED | OUTPATIENT
Start: 2023-12-20 | End: 2023-12-20

## 2023-12-20 RX ORDER — KETOROLAC TROMETHAMINE 30 MG/ML
INJECTION, SOLUTION INTRAMUSCULAR; INTRAVENOUS AS NEEDED
Status: DISCONTINUED | OUTPATIENT
Start: 2023-12-20 | End: 2023-12-20

## 2023-12-20 RX ORDER — PROPOFOL 10 MG/ML
INJECTION, EMULSION INTRAVENOUS AS NEEDED
Status: DISCONTINUED | OUTPATIENT
Start: 2023-12-20 | End: 2023-12-20

## 2023-12-20 RX ADMIN — SODIUM CHLORIDE 125 ML/HR: 0.9 INJECTION, SOLUTION INTRAVENOUS at 06:30

## 2023-12-20 RX ADMIN — ROCURONIUM BROMIDE 50 MG: 10 INJECTION, SOLUTION INTRAVENOUS at 07:33

## 2023-12-20 RX ADMIN — MIDAZOLAM 2 MG: 1 INJECTION INTRAMUSCULAR; INTRAVENOUS at 07:26

## 2023-12-20 RX ADMIN — FENTANYL CITRATE 50 MCG: 50 INJECTION INTRAMUSCULAR; INTRAVENOUS at 08:14

## 2023-12-20 RX ADMIN — TRIMETHOBENZAMIDE HYDROCHLORIDE 200 MG: 100 INJECTION INTRAMUSCULAR at 11:48

## 2023-12-20 RX ADMIN — FENTANYL CITRATE 50 MCG: 50 INJECTION INTRAMUSCULAR; INTRAVENOUS at 07:58

## 2023-12-20 RX ADMIN — CEFAZOLIN SODIUM 1000 MG: 1 SOLUTION INTRAVENOUS at 07:26

## 2023-12-20 RX ADMIN — FENTANYL CITRATE 50 MCG: 50 INJECTION, SOLUTION INTRAMUSCULAR; INTRAVENOUS at 10:06

## 2023-12-20 RX ADMIN — PROPOFOL 200 MG: 10 INJECTION, EMULSION INTRAVENOUS at 07:33

## 2023-12-20 RX ADMIN — SUGAMMADEX 200 MG: 100 INJECTION, SOLUTION INTRAVENOUS at 09:21

## 2023-12-20 RX ADMIN — ONDANSETRON 4 MG: 2 INJECTION INTRAMUSCULAR; INTRAVENOUS at 09:52

## 2023-12-20 RX ADMIN — INDOCYANINE GREEN AND WATER 25 MG: KIT at 06:28

## 2023-12-20 RX ADMIN — HYDROMORPHONE HYDROCHLORIDE 0.5 MG: 1 INJECTION, SOLUTION INTRAMUSCULAR; INTRAVENOUS; SUBCUTANEOUS at 08:46

## 2023-12-20 RX ADMIN — FENTANYL CITRATE 50 MCG: 50 INJECTION INTRAMUSCULAR; INTRAVENOUS at 07:33

## 2023-12-20 RX ADMIN — ROCURONIUM BROMIDE 10 MG: 10 INJECTION, SOLUTION INTRAVENOUS at 08:31

## 2023-12-20 RX ADMIN — FENTANYL CITRATE 50 MCG: 50 INJECTION INTRAMUSCULAR; INTRAVENOUS at 08:31

## 2023-12-20 RX ADMIN — ONDANSETRON 4 MG: 2 INJECTION INTRAMUSCULAR; INTRAVENOUS at 08:59

## 2023-12-20 RX ADMIN — LIDOCAINE HYDROCHLORIDE 100 MG: 20 INJECTION, SOLUTION EPIDURAL; INFILTRATION; INTRACAUDAL at 07:33

## 2023-12-20 RX ADMIN — SODIUM CHLORIDE, SODIUM LACTATE, POTASSIUM CHLORIDE, AND CALCIUM CHLORIDE: .6; .31; .03; .02 INJECTION, SOLUTION INTRAVENOUS at 08:01

## 2023-12-20 RX ADMIN — KETOROLAC TROMETHAMINE 30 MG: 30 INJECTION, SOLUTION INTRAMUSCULAR; INTRAVENOUS at 09:13

## 2023-12-20 RX ADMIN — FENTANYL CITRATE 50 MCG: 50 INJECTION, SOLUTION INTRAMUSCULAR; INTRAVENOUS at 09:54

## 2023-12-20 RX ADMIN — DEXAMETHASONE SODIUM PHOSPHATE 10 MG: 10 INJECTION INTRAMUSCULAR; INTRAVENOUS at 07:33

## 2023-12-20 RX ADMIN — PROPOFOL 80 MCG/KG/MIN: 10 INJECTION, EMULSION INTRAVENOUS at 07:33

## 2023-12-20 RX ADMIN — SODIUM CHLORIDE, SODIUM LACTATE, POTASSIUM CHLORIDE, AND CALCIUM CHLORIDE: .6; .31; .03; .02 INJECTION, SOLUTION INTRAVENOUS at 10:14

## 2023-12-20 RX ADMIN — OXYCODONE HYDROCHLORIDE 5 MG: 5 TABLET ORAL at 11:05

## 2023-12-20 NOTE — ANESTHESIA POSTPROCEDURE EVALUATION
"Post-Op Assessment Note    CV Status:  Stable    Pain management: adequate       Mental Status:  Alert and awake   Hydration Status:  Euvolemic   PONV Controlled:  Controlled   Airway Patency:  Patent     Post Op Vitals Reviewed: Yes      Staff: Anesthesiologist               BP      Temp      Pulse     Resp      SpO2      /87   Pulse 75   Temp 98.1 °F (36.7 °C) (Temporal)   Resp 16   Ht 5' 4\" (1.626 m)   Wt 61.5 kg (135 lb 9.3 oz)   SpO2 99%   BMI 23.27 kg/m²     "

## 2023-12-20 NOTE — H&P
No chief complaint on file.      Randolph Manning is a 38 y.o.male who is here for :    No chief complaint on file.  Seen twice in the ER in the last few months for biliary colic.  Has seen GI for hiatal hernia with Schatzki's ring  Currently: Cholelithiasis on April ultrasound      Assessment/Plan:   Randolph Manning is a 38 y.o.male who is here for possible Gallbladder disease.         Upon evaluation today patient has: Chronic Cholecystitis    .  Of note recent LFTs were relatively normal.    Plan: 1.  Bilary colic recurrent symptoms with increasing frequency and severity laparoscopic Cholecystectomy with possible Intraoperative Cholangiogram  Possible Robotic assisted  ]  patient has increasing symptoms and so we were able to expedite his surgery today.    Note.  Patient had a history of a undescended testicle repaired at age [age].  We discussed preoperatively the significance of this and the risk for testicular cancer and he knows to make an urology appointment postoperatively.  He understands the importance of doing this himself upon discharge for testicular cancer screening.      Full consent reviewed.    Informed consent for procedure was personally discussed, reviewed, and signed by Dr. Yousif. Discussion by Dr. Yousif was carried out regarding risks, benefits, and alternatives with the patient.   Risks include but are not limited to:  bleeding, infection, and delayed wound healing or an open wound, pulmonary embolus, leaks from or injury to the bowel or bile ducts or other viscus or blood vessels, possible need for transfusions, and death.  If the patient was at a higher average risk due to comorbidities including but not limited to: smoking, obesity, diabetes, this was also discussed that the patient was at a higher than average risk for postoperative complications or intraoperative issues altering the course of events.    This also included the risk of mesh use during the course of the surgery if  indicated, the short-term and long-term implications of using mesh including the risk of bowel fistula, multiple operations or failure of the mesh.      Discussion was also carried out regarding the need for additional procedures as indicated during the course of the surgery which may not have been explicitly discussed prior to the procedure.  Discussed in further detail the more common complications and their rates of occurrence.      A  was used if necessary.      Patient expressed understanding of the issues discussed and wished/consented to proceed.  All questions were answered by Dr. Yousif and Dr. Yousif personally reviewed this consent as dictated above.    Discussion performed between patient and the provider signing below.     Signature:   Jacquie Yousif MD  Date: 12/20/2023 Time: 7:26 AM     Post Op Pain Management: [facility]      Ultrasound findings:   Signed On: 4/26/2023 9:42 PM EDT  Impression    Cholelithiasis with minimal gallbladder wall thickening. The pericholecystic fat stranding on CT abdomen pelvis today appreciable on this examination. Consider chronic cholecystitis. If there is clinical concern for acute cholecystitis, consider HIDA   scan.     Signed By: Vera Hoffman DO    Signed On: 4/26/2023 7:39 PM EDT    2.  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  Needs to see hematology preop.  ####Cleared for surgery without any need for anticoagulation unless he stays inpatient.  No other perioperative management needed by oncology per their note on 9/12/2023.      3.  3 cm hiatal hernia with Schatzki's ring and symptomatic reflux which is running in his family.  He has a very reasonable BMI 24 .  He might benefit from surgical intervention and we have discussed surgical referral for this to our partner Dr. Syed.    Asif testing  Upper GI  EGD all confirm the diagnosis of a small to moderate hiatal hernia with consistent reflux to the mid thoracic esophagus.     Body mass index  "is 23.27 kg/m².     Preoperative Clearance: Hematology consult for known new factor V deficiency discovered when genetic testing was done for his child    In preparation for this visit all relevant and known prior office notes, prior consultations, emergency room visits, blood work results, and imaging studies were personally reviewed.  A total of  45 minutes was spent reviewing all of this information,caring for this patient, providing differential diagnosis, instructions for management, counseling and coordination of care.  This also includes planning surgical intervention where indicated.      Images:       Patient understands hernia occurrence or re-occurrence risk is higher in a diabetic, tobacco user, with elevated BMIs.   ____________________________________________________    HPI:  Randolph Manning is a 38 y.o.male who was referred for evaluation of There were no encounter diagnoses.      Abdominal pain Location: in the RUQ without radiation, which is Intermittent  and over 6 months     no nausea and no vomiting,     regular bowel movement.     Duration of pain or symptoms: over 6 months and no pain       Prior Colonoscopy : None     Prior Abdominal Surgery:     Anticoagulation: none    A1c:     Smoking Status: Non-smoker     Imaging:   XR cholangiogram intraoperative    (Results Pending)           LABS:    Lab Results   Component Value Date    K 3.8 07/14/2023     07/14/2023    CO2 21 07/14/2023    BUN 12 07/14/2023    CREATININE 1.17 07/14/2023    GLUF 101 (H) 04/12/2022    CALCIUM 9.7 07/14/2023    AST 24 12/18/2023    ALT 19 12/18/2023    ALKPHOS 92 12/18/2023    EGFR 78 07/14/2023       Lab Results   Component Value Date    WBC 14.52 (H) 07/14/2023    HGB 16.5 07/14/2023    HCT 47.7 07/14/2023    MCV 87 07/14/2023     07/14/2023     No results found for: \"INR\", \"PROTIME\"  Lab Results   Component Value Date    HGBA1C 5.6 04/12/2022           ROS:  General ROS: negative for - chills, fatigue, " fever or night sweats, weight loss  Respiratory ROS: no cough, shortness of breath, or wheezing  Cardiovascular ROS: no chest pain or dyspnea on exertion  Genito-Urinary ROS: no dysuria, trouble voiding, or hematuria  Musculoskeletal ROS: negative for - gait disturbance, joint pain or muscle pain  Neurological ROS: no TIA or stroke symptoms  Gastrointestinal ROS: See HPI   GI ROS: see HPI  Skin ROS: no new rashes or lesions   Lymphatic ROS: no new adenopathy noted by pt.   GYN ROS: see HPI, no new GYN history or bleeding noted  Psy ROS: no new mental or behavioral disturbances       Patient Active Problem List   Diagnosis    Factor V deficiency (HCC)    Heterozygous factor V Leiden mutation (HCC)    Carrier of hemochromatosis HFE gene mutation         Allergies:  Cefaclor      Current Facility-Administered Medications:     ceFAZolin (ANCEF) IVPB (premix in dextrose) 1,000 mg 50 mL, 1,000 mg, Intravenous, Once, Jacquie Yousif MD    sodium chloride 0.9 % infusion, 125 mL/hr, Intravenous, Continuous, Sin Hammond DO, Last Rate: 125 mL/hr at 12/20/23 0630, 125 mL/hr at 12/20/23 0630    Past Medical History:   Diagnosis Date    COVID-19 virus infection 01/2021    Psoriasis        Past Surgical History:   Procedure Laterality Date    UNDESCENDED TESTICLE EXPLORATION         Family History   Problem Relation Age of Onset    Hypertension Father     Hypertension Brother     Pancreatic cancer Maternal Grandmother     Throat cancer Paternal Grandfather         reports that he has quit smoking. His smoking use included cigarettes. He has never been exposed to tobacco smoke. He has never used smokeless tobacco. He reports current alcohol use of about 1.0 standard drink of alcohol per week. He reports that he does not use drugs.      Exam:   Vitals:    12/20/23 0639   BP: 129/92   Pulse: 77   Resp: 18   Temp: 98.2 °F (36.8 °C)   SpO2: 100%         PHYSICAL EXAM  General Appearance:    Alert, cooperative, no distress,   "  Head:    Normocephalic without obvious abnormality   Eyes:    PERRL, conjunctiva/corneas clear,       Neck:   Supple, no adenopathy, no JVD   Back:     Symmetric, no spinal or CVA tenderness   Lungs:     Clear to auscultation bilaterally, no wheezing or rhonchi   Heart:    Regular rate and rhythm, S1 and S2 normal, no murmur   Abdomen:     abdomen is soft without significant tenderness, masses, organomegaly or guarding Bowel sounds are normal.     Extremities:   Extremities normal. No clubbing, cyanosis or edema   Psych:   Normal Affect, AOx3.    Neurologic:  Skin:   CNII-XII intact. Strength symmetric, speech intact    Warm, dry, intact, no visible rashes or lesions  No testicular masses noted.    Informed consent for procedure was personally discussed, reviewed, and signed by Dr. Yousif. Discussion by Dr. Yousif was carried out regarding risks, benefits, and alternatives with the patient. Risks include but are not limited to:  bleeding, infection, and delayed wound healing or an open wound, pulmonary embolus, leaks from bowel or bile ducts or other viscus, transfusions, death.  Discussed in further detail the more common complications and their rates of occurrence.   was used if necessary.  Patient expressed understanding of the issues discussed and wished/consented to proceed.  All questions were answered by Dr. Yousif.    Discussion performed between patient and the provider signing below.     Signature:   No name on file    Date: 12/20/2023 Time: 7:24 AM                          Some portions of this record may have been generated with voice recognition software. There may be translation, syntax,  or grammatical errors. Occasional wrong word or \"sound-a-like\" substitutions may have occurred due to the inherent limitations of the voice recognition software. Read the chart carefully and recognize, using context, where substitutions may have occurred. If you have any questions, please contact the " dictating provider for clarification or correction, as needed. This encounter has been coded by a non-certified coder.

## 2023-12-20 NOTE — OP NOTE
CHOLECYSTECTOMY  W/ ROBOT  Postoperative Note  PATIENT NAME: Randolph Manning  : 1985  MRN: [id number]  [facility] ROOM 08    Surgery Date: 2023    Pre operative diagnosis:  Calculus of gallbladder and bile duct [K80.70]    Operative Indications:  Symptomatic gallbladder disease    Consent:  The risks, benefits, and alternatives to the surgery were discussed with the patient and with the family prior to surgery if present, personally by Dr. Yousif.  If the consent was obtained by the physician assistant or other representative, the consent was reviewed once again personally by the operating physician.  Common complications particular for this procedure as well as unusual complications were discussed, including but not limited to:  bleeding, wound infection, prolonged wound healing, open wounds, reoperation, leak from the bowel or viscus, leak from the bile duct or injury to adjacent or other organs or blood vessels in the abdomen. The significance of bile duct reconstruction was discussed.  If the surgery was laparoscopic, it was discussed that possible open surgery could also occur during that same surgery and is always an option in laparoscopic surgery and/or reoperation.  A  was used if necessary.  The patient expressed understanding of the issues discussed and wished and consented to the procedure to proceed.  All questions were answered.  Dr. Yousif personally discussed the informed consent with this patient.      Operative Findings:    ICG view, excellent critical view.  Some adhesions  Thick walled contracted gallbladder chronic cholecystitis with stones      Post operative diagnosis:  Post-Op Diagnosis Codes:     * Calculus of gallbladder and bile duct [K80.70]    Procedure:   Procedure(s):  CHOLECYSTECTOMY  W/ ROBOT  Lysis of adhesions    Interpretation of fluorescein dye by surgeon.            Surgeons and Role:     * Jacquie Yousif MD - Primary     * Selena Silva PA-C - Assisting      * Flaco Olguin MD - Assisting resident learner     * Magda Griggs PA-C - Assisting first assistant        The First  assistant/PA was medically necessary for surgical safety the case including suturing, retraction, and hemostasis. A qualified resident was not available for first assistance.  I provided direct and immediate supervision.  I was present for the entire procedure.     Drains:  * No LDAs found *    Specimens:  ID Type Source Tests Collected by Time Destination   1 : Gallbladdder Tissue Gallbladder TISSUE EXAM Jacquie Yousif MD 12/20/2023 0834        Estimated Blood Loss:   Minimal    Anesthesia Type:   Choice     Procedure:  The patient was seen again in the Holding Room. The risks, benefits, complications, treatment options, and expected outcomes were discussed with the patient. The possibilities of reaction to medication, pulmonary aspiration, perforation of viscus, bleeding, recurrent infection, finding a normal gallbladder, the need for additional procedures, failure to diagnose a condition, the possible need to convert to an open procedure, and creating a complication requiring transfusion or operation were discussed with the patient. The patient and/or family concurred with the proposed plan, giving informed consent. The site of surgery properly noted/marked. The patient was taken to Operating Room, identified as Randolph Manning and the procedure verified as Laparoscopic Cholecystectomy with Possible Intraoperative Cholangiogram. A Time Out was held and the above information confirmed.    Prior to the induction of general anesthesia, antibiotic prophylaxis was administered. General endotracheal anesthesia was then administered and tolerated well. After the induction, the abdomen was prepped in the usual sterile fashion. The patient was positioned in the supine position.The patient was positioned in the supine position, along with some reverse Trendelenburg.    Local anesthetic agent was  injected into the skin near the umbilicus and an incision made. The midline fascia was incised and the open technique was used to introduce a  port under direct vision.  Pneumoperitoneum was then created with CO2 and tolerated well without any adverse changes in the patient's vital signs. Additional trocars were introduced under direct vision. All skin incisions were infiltrated with a local anesthetic agent before making the incision and placing the trocars.  The patient was placed in the head up, left side down position.     Robotic ports were used.  The robot was then docked.    The gallbladder was identified, the fundus grasped and retracted cephalad. Adhesions were lysed bluntly and with the electrocautery where indicated, taking care not to injure any adjacent organs or viscus. The infundibulum was grasped and retracted laterally, exposing the peritoneum overlying the triangle of Calot. The peritoneum was removed anteriorly and posteriorly to the gallbladder, with special attention to the backside of the gallbladder dissection.  This allowed for freeing up the gallbladder.  The critical view of the triangle Calot was carried out, dissecting out the cystic duct and cystic artery as the only two tubular structures leading to the gallbladder. Once these were clearly identified, the back wall of the gallbladder was lifted away from the cystic plate to expose the posterior aspect of this dissection, ensuring that there were no posterior structures leading into the liver.     Fluorescein dye had been given to the patient preoperatively.  Using the appropriate camera view, the common bile duct was visualized and well away from the cystic duct during the critical view.    The cystic duct was then doubly ligated with Surgical Clips on the patient side and singly clipped on the gallbladder side and divided. The cystic artery was re-identified and ligated with clips and divided as well.   There was an additional  "posterior artery, that did not light up with ICG and this was triple checked.  There was active bleeding from this tiny artery.  Metallic clips were used, 5 mm, through the left upper quadrant port to control this small artery.    The gallbladder was dissected from the liver bed in retrograde fashion with the electrocautery or harmonic scalpel where appropriate.  The gallbladder was removed, via an Endo pouch, through the 10 mm port.     The liver bed was irrigated and inspected. Hemostasis was achieved. Copious irrigation was utilized and was repeatedly aspirated until clear.      The pneumoperitoneum was completely reduced after viewing removal of the trocars under direct vision. The wounds were thoroughly irrigated and the larger port site fascia was then closed with a figure of eight suture; the skin was then closed with 4-0 Monocryl sutures and a sterile dressing was applied.      Sponge count and needle count and instrument count were correct x2, and RFA wanding for sponges was also negative at the end of the procedure prior to closure.     The patient tolerated the procedure well.      Some portions of this record may have been generated with voice recognition software. There may be translation, syntax,  or grammatical errors. Occasional wrong word or \"sound-a-like\" substitutions may have occurred due to the inherent limitations of the voice recognition software. Read the chart carefully and recognize, using context, where substations may have occurred. If you have any questions, please contact the dictating provider for clarification or correction, as needed.       Complications: None    Condition: Stable to PACU    SIGNATURE: Jacquie Mcintosh MD   DATE: December 20, 2023   TIME: 9:23 AM    "

## 2023-12-20 NOTE — DISCHARGE INSTR - AVS FIRST PAGE
[facility]  Post-Operative Care Instructions  Dr. Jacquie Yousif MD, FACS    1. General: You will feel pulling sensations around the wound or funny aches and pains around the incisions. This is normal. Even minor surgery is a change in your body and this is your body’s way of reaction to it. If you have had abdominal surgery, it may help to support the incision with a small pillow or blanket for comfort when moving or coughing.    2. Wound care: Make sure to remove the bandage in about 24 hours, unless instructed otherwise. You usually don't have to redress the wound after 24-48 hours, unless for comfort. Keep the incision clean and dry. Let air get to it. If this Steri-Strips fall off, just keep the wound clean.    3. Water: You may shower over the wound, unless there are drain tubes left in place. Do not bathe or use a pool or hot tub until cleared by the physician. You may shower right over the staples or Steri-Strips and packing dry when you are done.    4. Activity: You may go up and down stairs, walk as much as you are comfortable, but walk at least 3 times each day. If you have had abdominal surgery, do not lift anything heavier than 15 pounds for at least 2-4 weeks, unless cleared by the doctor.    5. Diet: You may resume a regular diet. If you had a same-day surgery or overnight stay surgery, you may wish to eat lightly for a few days: soups, crackers, and sandwiches. You may resume a regular diet when ready.    6. Medications: Resume all of your previous medications, unless told otherwise by the doctor. Avoid aspirin or ibuprofen (Advil, Motrin, etc.) products for 2-3 days after the date of surgery. You may, at that time, began to take them again. Tylenol is always fine, unless you are taking any narcotic pain medication containing Tylenol (such as Percocet, Darvocet, Vicodin, or anything containing acetaminophen). Do not take Tylenol if you're taking these medications. You do not need  to take the narcotic pain medications unless you are having significant pain and discomfort.    7. Driving: You will need someone to drive you home on the day of surgery. Do not drive or make any important decisions while on narcotic pain medication or 24 hours and after anesthesia or sedation for surgery. Generally, you may drive when your off all narcotic pain medications.    8. Upset Stomach: You may take Maalox, Tums, or similar items for an upset stomach. If your narcotic pain medication causes an upset stomach, do not take it on an empty stomach. Try taking it with at least some crackers or toast.     9. Constipation: Patients often experienced constipation after surgery. You may take over-the-counter medication for this, such as Metamucil, Senokot, Dulcolax, milk of magnesia, etc. You may take a suppository unless you have had anorectal surgery such as a procedure on your hemorrhoids. If you experience significant nausea or vomiting after abdominal surgery, call the office before trying any of these medications.    10. Call the office: If you are experiencing any of the following, fevers above 101.5°, significant nausea or vomiting, if the wound develops drainage and/or is excessive redness around the wound, or if you have significant diarrhea or other worsening symptoms.    11. Pain: You may be given a prescription for pain. This will be given to the hospital, the day of surgery.    12. Sexual Activity: You may resume sexual activity when you feel ready and comfortable and your incision is sealed and healed without apparent infection risk.    13. Urination: If you haven't urinated in 6 hours, go directly to the ER for evaluation for urinary retention.     [facility]  [address] [place] [address], [address]  [place] [address]  Phone: [phone]

## 2023-12-21 ENCOUNTER — TELEPHONE (OUTPATIENT)
Dept: SURGERY | Facility: CLINIC | Age: 38
End: 2023-12-21

## 2023-12-21 NOTE — TELEPHONE ENCOUNTER
Spoke with patient to check in on him states he has some soreness but nothing out of the ordinary.

## 2023-12-21 NOTE — TELEPHONE ENCOUNTER
----- Message from Jocelyne Martinez PA-C sent at 12/21/2023  7:58 AM EST -----  Please let him know his liver function panel is normal

## 2023-12-26 PROCEDURE — 88304 TISSUE EXAM BY PATHOLOGIST: CPT | Performed by: PATHOLOGY

## 2023-12-26 NOTE — PROGRESS NOTES
Assessment/Plan:   Randolph Manning is a 38 y.o.male who comes in today for postoperative check after robotic cholecystectomy 12/20/23 with Dr. Yousif.       Patient is pleased with the outcome of surgery and is doing well.     Pathology: Pathology reviewed with patient, all questions answered.  Final Diagnosis   A. Gallbladder, cholecystectomy:  - Acute and chronic cholecystitis with cholelithiasis and prominent eosinophils.     -- Focal epithelial ulceration and erosion.   - Reactive glandular atypia; negative for dysplasia and malignancy.       ______________________________________________________  HPI:  Randolph Manning is a 38 y.o.male who comes in today for postoperative check after recent robotic cholecystectomy 12/20/23 with Dr. Yousif.     Currently doing well without problems, no fever or chills,no nausea and no vomiting. Patient reports they have resumed their normal diet. denies biliary diarrhea. Reports no issues.    ROS:  General ROS: negative for - chills, fatigue, fever or night sweats, weight loss  Respiratory ROS: no cough, shortness of breath, or wheezing  Cardiovascular ROS: no chest pain or dyspnea on exertion  Genito-Urinary ROS: no dysuria, trouble voiding, or hematuria  Musculoskeletal ROS: negative for - gait disturbance, joint pain or muscle pain  Neurological ROS: no TIA or stroke symptoms  GI ROS: see HPI  Skin ROS: no new rashes or lesions   Lymphatic ROS: no new adenopathy noted by pt.   Psy ROS: no new mental or behavioral disturbances       Patient Active Problem List   Diagnosis    Factor V deficiency (HCC)    Heterozygous factor V Leiden mutation (HCC)    Carrier of hemochromatosis HFE gene mutation           Allergies:  Cefaclor      Current Outpatient Medications:     ibuprofen (MOTRIN) 600 mg tablet, Take 1 tablet (600 mg total) by mouth every 6 (six) hours as needed for mild pain or moderate pain, Disp: 30 tablet, Rfl: 0    omeprazole (PriLOSEC) 20 mg delayed release capsule,  Take 20 mg by mouth every other day, Disp: , Rfl:     Past Medical History:   Diagnosis Date    COVID-19 virus infection 01/2021    Psoriasis        Past Surgical History:   Procedure Laterality Date    CA LAPAROSCOPY SURG CHOLECYSTECTOMY N/A 12/20/2023    Procedure: CHOLECYSTECTOMY  W/ ROBOT and LYSIS OF ADHESIONS;  Surgeon: Jacquie Yousif MD;  Location: [facility] OR;  Service: General    UNDESCENDED TESTICLE EXPLORATION         Family History   Problem Relation Age of Onset    Hypertension Father     Hypertension Brother     Pancreatic cancer Maternal Grandmother     Throat cancer Paternal Grandfather         reports that he has quit smoking. His smoking use included cigarettes. He has never been exposed to tobacco smoke. He has never used smokeless tobacco. He reports current alcohol use of about 1.0 standard drink of alcohol per week. He reports that he does not use drugs.    Vitals:    01/04/24 1127   BP: 118/64   Pulse: 58   Temp: 97.7 °F (36.5 °C)   SpO2: 99%       PHYSICAL EXAM  General: normal, cooperative, no distress  Abdominal: soft, nondistended, or nontender  Incision: clean, dry, and intact and healing well        Jocelyne Martinez PA-C      Date: 1/4/2024 Time: 11:34 AM

## 2024-01-04 ENCOUNTER — OFFICE VISIT (OUTPATIENT)
Dept: SURGERY | Facility: CLINIC | Age: 39
End: 2024-01-04

## 2024-01-04 VITALS
HEART RATE: 58 BPM | SYSTOLIC BLOOD PRESSURE: 118 MMHG | HEIGHT: 64 IN | WEIGHT: 145 LBS | TEMPERATURE: 97.7 F | DIASTOLIC BLOOD PRESSURE: 64 MMHG | BODY MASS INDEX: 24.75 KG/M2 | OXYGEN SATURATION: 99 %

## 2024-01-04 DIAGNOSIS — Z87.438 H/O UNDESCENDED TESTICLE: ICD-10-CM

## 2024-01-04 DIAGNOSIS — Z90.49 S/P LAPAROSCOPIC CHOLECYSTECTOMY: Primary | ICD-10-CM

## 2024-01-04 DIAGNOSIS — Z12.71 SCREENING FOR TESTICULAR CANCER: ICD-10-CM

## 2024-01-04 PROCEDURE — 99024 POSTOP FOLLOW-UP VISIT: CPT | Performed by: PHYSICIAN ASSISTANT

## 2024-01-04 NOTE — PROGRESS NOTES
Assessment/Plan:   Randolph Manning is a 38 y.o.male who comes in today for postoperative check after robotic cholecystectomy 12/20/23 with Dr. Yousif.         Patient is pleased with the outcome of surgery and is doing well.      Pathology: Pathology reviewed with patient, all questions answered.  Final Diagnosis   A. Gallbladder, cholecystectomy:  - Acute and chronic cholecystitis with cholelithiasis and prominent eosinophils.     -- Focal epithelial ulceration and erosion.   - Reactive glandular atypia; negative for dysplasia and malignancy.        Patient had previous discussed he has a history of undescended right testicle that he had surgically fixed at age 6. Dr. Yousif would like him to consult urology due to increased concern for testicular cancer. Referral to urology made.  ______________________________________________________  HPI:  Randolph Manning is a 38 y.o.male who comes in today for postoperative check after recent robotic cholecystectomy 12/20/23 with Dr. Yousif.      Currently doing well without problems, no fever or chills,no nausea and no vomiting. Patient reports they have resumed their normal diet. denies biliary diarrhea. Reports no issues.     ROS:  General ROS: negative for - chills, fatigue, fever or night sweats, weight loss  Respiratory ROS: no cough, shortness of breath, or wheezing  Cardiovascular ROS: no chest pain or dyspnea on exertion  Genito-Urinary ROS: no dysuria, trouble voiding, or hematuria  Musculoskeletal ROS: negative for - gait disturbance, joint pain or muscle pain  Neurological ROS: no TIA or stroke symptoms  GI ROS: see HPI  Skin ROS: no new rashes or lesions   Lymphatic ROS: no new adenopathy noted by pt.   Psy ROS: no new mental or behavioral disturbances             Patient Active Problem List   Diagnosis    Factor V deficiency (HCC)    Heterozygous factor V Leiden mutation (HCC)    Carrier of hemochromatosis HFE gene mutation               Allergies:  Cefaclor         Current Outpatient Medications:     ibuprofen (MOTRIN) 600 mg tablet, Take 1 tablet (600 mg total) by mouth every 6 (six) hours as needed for mild pain or moderate pain, Disp: 30 tablet, Rfl: 0    omeprazole (PriLOSEC) 20 mg delayed release capsule, Take 20 mg by mouth every other day, Disp: , Rfl:      Medical History        Past Medical History:   Diagnosis Date    COVID-19 virus infection 01/2021    Psoriasis              Surgical History         Past Surgical History:   Procedure Laterality Date    SD LAPAROSCOPY SURG CHOLECYSTECTOMY N/A 12/20/2023     Procedure: CHOLECYSTECTOMY  W/ ROBOT and LYSIS OF ADHESIONS;  Surgeon: Jacquie Yousif MD;  Location: [facility];  Service: General    UNDESCENDED TESTICLE EXPLORATION                Family History         Family History   Problem Relation Age of Onset    Hypertension Father      Hypertension Brother      Pancreatic cancer Maternal Grandmother      Throat cancer Paternal Grandfather               reports that he has quit smoking. His smoking use included cigarettes. He has never been exposed to tobacco smoke. He has never used smokeless tobacco. He reports current alcohol use of about 1.0 standard drink of alcohol per week. He reports that he does not use drugs.         Vitals:     01/04/24 1127   BP: 118/64   Pulse: 58   Temp: 97.7 °F (36.5 °C)   SpO2: 99%         PHYSICAL EXAM  General: normal, cooperative, no distress  Abdominal: soft, nondistended, or nontender  Incision: clean, dry, and intact and healing well           Jocelyne Martinez PA-C        Date: 1/4/2024 Time: 11:34 AM

## 2024-12-03 ENCOUNTER — TELEPHONE (OUTPATIENT)
Dept: FAMILY MEDICINE CLINIC | Facility: CLINIC | Age: 39
End: 2024-12-03

## 2024-12-03 NOTE — TELEPHONE ENCOUNTER
Pt Attribution #1   Called pt and lvm in regards to scheduling an appointment. Pt is due for his physical. Last physical 4/19/22.

## 2025-02-13 ENCOUNTER — OFFICE VISIT (OUTPATIENT)
Dept: FAMILY MEDICINE CLINIC | Facility: CLINIC | Age: 40
End: 2025-02-13
Payer: COMMERCIAL

## 2025-02-13 VITALS
HEIGHT: 64 IN | HEART RATE: 98 BPM | WEIGHT: 150 LBS | SYSTOLIC BLOOD PRESSURE: 122 MMHG | OXYGEN SATURATION: 95 % | TEMPERATURE: 98.6 F | DIASTOLIC BLOOD PRESSURE: 80 MMHG | BODY MASS INDEX: 25.61 KG/M2

## 2025-02-13 DIAGNOSIS — Z00.00 ANNUAL PHYSICAL EXAM: Primary | ICD-10-CM

## 2025-02-13 DIAGNOSIS — Z13.29 SCREENING FOR THYROID DISORDER: ICD-10-CM

## 2025-02-13 DIAGNOSIS — K21.9 GASTROESOPHAGEAL REFLUX DISEASE WITHOUT ESOPHAGITIS: ICD-10-CM

## 2025-02-13 DIAGNOSIS — Q53.10 UNILATERAL UNDESCENDED TESTICLE, UNSPECIFIED LOCATION: ICD-10-CM

## 2025-02-13 DIAGNOSIS — Z11.59 NEED FOR HEPATITIS C SCREENING TEST: ICD-10-CM

## 2025-02-13 DIAGNOSIS — Z13.0 SCREENING FOR IRON DEFICIENCY ANEMIA: ICD-10-CM

## 2025-02-13 DIAGNOSIS — Z13.220 SCREENING FOR CHOLESTEROL LEVEL: ICD-10-CM

## 2025-02-13 DIAGNOSIS — Z13.1 SCREENING FOR DIABETES MELLITUS: ICD-10-CM

## 2025-02-13 PROBLEM — K80.50 BILIARY COLIC: Status: ACTIVE | Noted: 2023-04-26

## 2025-02-13 PROCEDURE — 99396 PREV VISIT EST AGE 40-64: CPT | Performed by: FAMILY MEDICINE

## 2025-02-13 NOTE — PROGRESS NOTES
Adult Annual Physical  Name: Randolph Manning      : 1985      MRN: [id number]  Encounter Provider: Tonia Mills DO  Encounter Date: 2025   Encounter department: [facility]    Assessment & Plan  Annual physical exam         BMI 25.0-25.9,adult           Immunizations and preventive care screenings were discussed with patient today. Appropriate education was printed on patient's after visit summary.    Discussed risks and benefits of prostate cancer screening. We discussed the controversial history of PSA screening for prostate cancer in the [place] as well as the risk of over detection and over treatment of prostate cancer by way of PSA screening.  The patient understands that PSA blood testing is an imperfect way to screen for prostate cancer and that elevated PSA levels in the blood may also be caused by infection, inflammation, prostatic trauma or manipulation, urological procedures, or by benign prostatic enlargement.    The role of the digital rectal examination in prostate cancer screening was also discussed and I discussed with him that there is large interobserver variability in the findings of digital rectal examination.    Counseling:  Alcohol/drug use: discussed moderation in alcohol intake, the recommendations for healthy alcohol use, and avoidance of illicit drug use.  Dental Health: discussed importance of regular tooth brushing, flossing, and dental visits.  Injury prevention: discussed safety/seat belts, safety helmets, smoke detectors, carbon monoxide detectors, and smoking near bedding or upholstery.  Sexual health: discussed sexually transmitted diseases, partner selection, use of condoms, avoidance of unintended pregnancy, and contraceptive alternatives.  Exercise: the importance of regular exercise/physical activity was discussed. Recommend exercise 3-5 times per week for at least 30 minutes.          History of Present Illness     Adult Annual  Physical:  Patient presents for annual physical.     Diet and Physical Activity:  - Diet/Nutrition: well balanced diet.  - Exercise: no formal exercise.    Depression Screening:  - PHQ-2 Score: 0    General Health:  - Sleep: sleeps well.  - Hearing: normal hearing bilateral ears.  - Vision: goes for regular eye exams.  - Dental: no dental visits for > 1 year.    /GYN Health:    - History of STDs: no     Health:  - History of STDs: no.     Review of Systems   Constitutional:  Negative for activity change, chills, fatigue and fever.   HENT:  Negative for congestion, ear pain, sinus pressure and sore throat.    Eyes:  Negative for redness, itching and visual disturbance.   Respiratory:  Negative for cough and shortness of breath.    Cardiovascular:  Negative for chest pain and palpitations.   Gastrointestinal:  Negative for abdominal pain, diarrhea and nausea.   Endocrine: Negative for cold intolerance and heat intolerance.   Genitourinary:  Negative for dysuria, flank pain and frequency.   Musculoskeletal:  Negative for arthralgias, back pain, gait problem and myalgias.   Skin:  Negative for color change.   Allergic/Immunologic: Negative for environmental allergies.   Neurological:  Negative for dizziness, numbness and headaches.   Psychiatric/Behavioral:  Negative for behavioral problems and sleep disturbance.      Medical History Reviewed by provider this encounter:  Tobacco  Allergies  Meds  Problems  Med Hx  Surg Hx  Fam Hx     .  Current Outpatient Medications on File Prior to Visit   Medication Sig Dispense Refill    ibuprofen (MOTRIN) 600 mg tablet Take 1 tablet (600 mg total) by mouth every 6 (six) hours as needed for mild pain or moderate pain 30 tablet 0    omeprazole (PriLOSEC) 20 mg delayed release capsule Take 20 mg by mouth every other day       No current facility-administered medications on file prior to visit.      Social History     Tobacco Use    Smoking status: Former     Types:  "Cigarettes     Passive exposure: Never    Smokeless tobacco: Never   Vaping Use    Vaping status: Never Used   Substance and Sexual Activity    Alcohol use: Yes     Alcohol/week: 1.0 standard drink of alcohol     Types: 1 Cans of beer per week     Comment: occasional    Drug use: Never    Sexual activity: Yes     Partners: Female       Objective   /80 (BP Location: Left arm, Patient Position: Sitting, Cuff Size: Standard)   Pulse 98   Temp 98.6 °F (37 °C)   Ht 5' 4\" (1.626 m)   Wt 68 kg (150 lb)   SpO2 95%   BMI 25.75 kg/m²     Physical Exam  Vitals reviewed.   Constitutional:       General: He is not in acute distress.     Appearance: Normal appearance. He is well-developed.   HENT:      Head: Normocephalic and atraumatic.      Right Ear: Tympanic membrane, ear canal and external ear normal. There is no impacted cerumen.      Left Ear: Tympanic membrane, ear canal and external ear normal. There is no impacted cerumen.      Nose: Nose normal. No congestion or rhinorrhea.      Mouth/Throat:      Mouth: Mucous membranes are moist.      Pharynx: No oropharyngeal exudate or posterior oropharyngeal erythema.   Eyes:      General: No scleral icterus.        Right eye: No discharge.         Left eye: No discharge.      Extraocular Movements: Extraocular movements intact.      Conjunctiva/sclera: Conjunctivae normal.      Pupils: Pupils are equal, round, and reactive to light.   Neck:      Trachea: No tracheal deviation.   Cardiovascular:      Rate and Rhythm: Normal rate and regular rhythm.      Pulses: Normal pulses.           Dorsalis pedis pulses are 2+ on the right side and 2+ on the left side.        Posterior tibial pulses are 2+ on the right side and 2+ on the left side.      Heart sounds: Normal heart sounds. No murmur heard.     No friction rub. No gallop.   Pulmonary:      Effort: Pulmonary effort is normal. No respiratory distress.      Breath sounds: Normal breath sounds. No wheezing, rhonchi or " rales.   Abdominal:      General: Bowel sounds are normal. There is no distension.      Palpations: Abdomen is soft.      Tenderness: There is no abdominal tenderness. There is no guarding or rebound.   Musculoskeletal:         General: Normal range of motion.      Cervical back: Normal range of motion and neck supple.      Right lower leg: No edema.      Left lower leg: No edema.   Lymphadenopathy:      Head:      Right side of head: No submental or submandibular adenopathy.      Left side of head: No submental or submandibular adenopathy.      Cervical: No cervical adenopathy.      Right cervical: No superficial, deep or posterior cervical adenopathy.     Left cervical: No superficial, deep or posterior cervical adenopathy.   Skin:     General: Skin is warm and dry.      Findings: No erythema.   Neurological:      General: No focal deficit present.      Mental Status: He is alert and oriented to person, place, and time.      Cranial Nerves: No cranial nerve deficit.      Sensory: Sensation is intact. No sensory deficit.      Motor: Motor function is intact.   Psychiatric:         Attention and Perception: Attention and perception normal.         Mood and Affect: Mood is not anxious or depressed.         Speech: Speech normal.         Behavior: Behavior normal.         Thought Content: Thought content normal.         Judgment: Judgment normal.

## 2025-02-13 NOTE — PATIENT INSTRUCTIONS
"Patient Education     Routine physical for adults   The Basics   Written by the doctors and editors at [facility]   What is a physical? -- A physical is a routine visit, or \"check-up,\" with your doctor. You might also hear it called a \"wellness visit\" or \"preventive visit.\"  During each visit, the doctor will:   Ask about your physical and mental health   Ask about your habits, behaviors, and lifestyle   Do an exam   Give you vaccines if needed   Talk to you about any medicines you take   Give advice about your health   Answer your questions  Getting regular check-ups is an important part of taking care of your health. It can help your doctor find and treat any problems you have. But it's also important for preventing health problems.  A routine physical is different from a \"sick visit.\" A sick visit is when you see a doctor because of a health concern or problem. Since physicals are scheduled ahead of time, you can think about what you want to ask the doctor.  How often should I get a physical? -- It depends on your age and health. In general, for people age 21 years and older:   If you are younger than 50 years, you might be able to get a physical every 3 years.   If you are 50 years or older, your doctor might recommend a physical every year.  If you have an ongoing health condition, like diabetes or high blood pressure, your doctor will probably want to see you more often.  What happens during a physical? -- In general, each visit will include:   Physical exam - The doctor or nurse will check your height, weight, heart rate, and blood pressure. They will also look at your eyes and ears. They will ask about how you are feeling and whether you have any symptoms that bother you.   Medicines - It's a good idea to bring a list of all the medicines you take to each doctor visit. Your doctor will talk to you about your medicines and answer any questions. Tell them if you are having any side effects that bother you. You " "should also tell them if you are having trouble paying for any of your medicines.   Habits and behaviors - This includes:   Your diet   Your exercise habits   Whether you smoke, drink alcohol, or use drugs   Whether you are sexually active   Whether you feel safe at home  Your doctor will talk to you about things you can do to improve your health and lower your risk of health problems. They will also offer help and support. For example, if you want to quit smoking, they can give you advice and might prescribe medicines. If you want to improve your diet or get more physical activity, they can help you with this, too.   Lab tests, if needed - The tests you get will depend on your age and situation. For example, your doctor might want to check your:   Cholesterol   Blood sugar   Iron level   Vaccines - The recommended vaccines will depend on your age, health, and what vaccines you already had. Vaccines are very important because they can prevent certain serious or deadly infections.   Discussion of screening - \"Screening\" means checking for diseases or other health problems before they cause symptoms. Your doctor can recommend screening based on your age, risk, and preferences. This might include tests to check for:   Cancer, such as breast, prostate, cervical, ovarian, colorectal, prostate, lung, or skin cancer   Sexually transmitted infections, such as chlamydia and gonorrhea   Mental health conditions like depression and anxiety  Your doctor will talk to you about the different types of screening tests. They can help you decide which screenings to have. They can also explain what the results might mean.   Answering questions - The physical is a good time to ask the doctor or nurse questions about your health. If needed, they can refer you to other doctors or specialists, too.  Adults older than 65 years often need other care, too. As you get older, your doctor will talk to you about:   How to prevent falling at " home   Hearing or vision tests   Memory testing   How to take your medicines safely   Making sure that you have the help and support you need at home  All topics are updated as new evidence becomes available and our peer review process is complete.  This topic retrieved from [employer] on: May 02, 2024.  Topic 168383 Version 1.0  Release: 32.4.3 - C32.122  © 2024 UpToDate, Inc. and/or its affiliates. All rights reserved.  Consumer Information Use and Disclaimer   Disclaimer: This generalized information is a limited summary of diagnosis, treatment, and/or medication information. It is not meant to be comprehensive and should be used as a tool to help the user understand and/or assess potential diagnostic and treatment options. It does NOT include all information about conditions, treatments, medications, side effects, or risks that may apply to a specific patient. It is not intended to be medical advice or a substitute for the medical advice, diagnosis, or treatment of a health care provider based on the health care provider's examination and assessment of a patient's specific and unique circumstances. Patients must speak with a health care provider for complete information about their health, medical questions, and treatment options, including any risks or benefits regarding use of medications. This information does not endorse any treatments or medications as safe, effective, or approved for treating a specific patient. UpToDate, Inc. and its affiliates disclaim any warranty or liability relating to this information or the use thereof.The use of this information is governed by the Terms of Use, available at https://www.woltersLoudeyeuwer.com/en/know/clinical-effectiveness-terms. 2024© UpToDate, Inc. and its affiliates and/or licensors. All rights reserved.  Copyright   © 2024 UpToDate, Inc. and/or its affiliates. All rights reserved.

## 2025-03-10 ENCOUNTER — TELEPHONE (OUTPATIENT)
Dept: GASTROENTEROLOGY | Facility: CLINIC | Age: 40
End: 2025-03-10

## 2025-03-10 NOTE — TELEPHONE ENCOUNTER
Called and left a message for the patient stating that we switched the visit to virtual due to a family emergency with Dr. Sierra. If he can't do the visit virtually to please call the office back to reschedule for another day with either Dr Sierra or Josselin.

## 2025-03-11 ENCOUNTER — TELEMEDICINE (OUTPATIENT)
Dept: GASTROENTEROLOGY | Facility: CLINIC | Age: 40
End: 2025-03-11
Payer: COMMERCIAL

## 2025-03-11 VITALS — WEIGHT: 150 LBS | BODY MASS INDEX: 25.61 KG/M2 | HEIGHT: 64 IN

## 2025-03-11 DIAGNOSIS — K21.9 GASTROESOPHAGEAL REFLUX DISEASE WITHOUT ESOPHAGITIS: ICD-10-CM

## 2025-03-11 DIAGNOSIS — K21.9 GASTROESOPHAGEAL REFLUX DISEASE, UNSPECIFIED WHETHER ESOPHAGITIS PRESENT: Primary | ICD-10-CM

## 2025-03-11 PROCEDURE — 99204 OFFICE O/P NEW MOD 45 MIN: CPT | Performed by: INTERNAL MEDICINE

## 2025-03-11 RX ORDER — SODIUM CHLORIDE, SODIUM LACTATE, POTASSIUM CHLORIDE, CALCIUM CHLORIDE 600; 310; 30; 20 MG/100ML; MG/100ML; MG/100ML; MG/100ML
125 INJECTION, SOLUTION INTRAVENOUS CONTINUOUS
OUTPATIENT
Start: 2025-03-11

## 2025-03-11 NOTE — PROGRESS NOTES
[facility] - Outpatient Consultation  Randolph Manning 39 y.o. male MRN: [id number]  Encounter: [id number]          REASON FOR CONSULT:  1. Gastroesophageal reflux disease, unspecified whether esophagitis present        2. Gastroesophageal reflux disease without esophagitis  Ambulatory Referral to Gastroenterology           ASSESSMENT AND PLAN:      1. Gastroesophageal reflux disease, unspecified whether esophagitis present  Assessment & Plan:  - discussed dietary modification and weight loss.   - discussed intermittent drug holidays off PPI and potential long-term use adverse events.   - Can cont otc omep from now, or if loses weight and able to wean, can use Pepcid otc prn.  - discussed indication for HH repair would be sx refractory to medical management.  - EGD for BE screening.   Rtc prn  2. Gastroesophageal reflux disease without esophagitis  Assessment & Plan:  - discussed dietary modification and weight loss.   - discussed intermittent drug holidays off PPI and potential long-term use adverse events.   - Can cont otc omep from now, or if loses weight and able to wean, can use Pepcid otc prn.  - discussed indication for HH repair would be sx refractory to medical management.  - EGD for BE screening.   Rtc prn  Orders:  -     Ambulatory Referral to Gastroenterology        The assessment and recommendations were discussed with the patient and/or family members via video visit using the Epic Embedded platform, and they verbalized their understanding. All questions were answered to their satisfaction. They are in agreement with the recommendations. The recommendations were also communicated to the primary team/referring provider / are available for their review in the patient's chart. ______________________________________________________________________    HPI:  39yoM with longstanding intermittent GERD. Sx largely controlled on otc Omeprazole.   Would like to transfer care to [facility].    Uses Omep essentially daily.  Denies dysphagia.   No recent weight loss or GIB.     EGD 2022:  Post-operative Diagnosis: Mild LA class A erosive esophagitis, 3 cm sliding hiatal hernia, 4 mm gastric antral nodule (removed with snare, path = Hyperplastic polyp with xanthomatous changes).     48 hour Bravo 2022:  Patient History:  Medication:  [] Taking Proton Pump Inhibitor  [x] Off of Proton Pump Inhibitor    Findings: (pH threshold 4.0)     Acid Exposure Summary:   Total Upright Supine   Acid exposure time (%) 30.2 (<4.9) 26.7 (<7.3) 37.7 (<1.4)   Longest reflux (min) 241.3 (<16.0)   DeMeester Score 107.5 (<14.7)     Symptom Association Summary:   Symptom Heartburn   Number of occurrences 9   Symptom index for reflux (SI) 44.4   Symptom association probability (SAP)* 82.1     Symptom Chest Pain   Number of occurrences 1   Symptom index for reflux (SI) 0.0   Symptom association probability (SAP)* 0.0     Symptom Regurgitation   Number of occurrences 8   Symptom index for reflux (SI) 25.0   Symptom association probability (SAP)* 0.0   *Probability that the symptom and reflux are not associated by chance (>95% is significant)    Impression: Probe appears to have dislodged and fallen into stomach based on pH plot showing continuous acid exposure for the first 12 hours of the study (and none thereafter). Above results not interpretable with regards to esophageal acid time or symptom correlation. Consider repeat evaluation.     REVIEW OF SYSTEMS:  A complete 14-pt review of systems is negative other than the relevant positive symptoms mentioned in the HPI.      Historical Information   Past Medical History:   Diagnosis Date    COVID-19 virus infection 01/2021    Psoriasis      Past Surgical History:   Procedure Laterality Date    DC LAPAROSCOPY SURG CHOLECYSTECTOMY N/A 12/20/2023    Procedure: CHOLECYSTECTOMY  W/ ROBOT and LYSIS OF ADHESIONS;  Surgeon: Jacquie Yousif MD;  Location: [facility];  Service:  General    UNDESCENDED TESTICLE EXPLORATION       Social History   Social History     Substance and Sexual Activity   Alcohol Use Yes    Alcohol/week: 1.0 standard drink of alcohol    Types: 1 Cans of beer per week    Comment: occasional     Social History     Substance and Sexual Activity   Drug Use Never     Social History     Tobacco Use   Smoking Status Former    Types: Cigarettes    Passive exposure: Never   Smokeless Tobacco Never     Family History   Problem Relation Age of Onset    Hypertension Father     Hypertension Brother     Pancreatic cancer Maternal Grandmother     Throat cancer Paternal Grandfather        Meds/Allergies       Current Outpatient Medications:     ibuprofen (MOTRIN) 600 mg tablet    omeprazole (PriLOSEC) 20 mg delayed release capsule    Allergies   Allergen Reactions    Cefaclor Rash and Edema         PHYSICAL EXAM:    Appearance and vitals taken from home devices    General Appearance:   Alert, cooperative, no distress   HEENT:  Normocephalic, atraumatic, anicteric. Neck supple, symmetrical, trachea midline.   Lungs:   Equal chest rise and unlabored breathing, normal effort, no coughing.   Cardiovascular:   No visualized JVD.   Abdomen:   No abdominal distension.   Skin:   No jaundice, rashes, or lesions.    Musculoskeletal:   Normal range of motion visualized.   Psych:  Normal affect and normal insight.   Neuro:  Alert and appropriate.       Lab Results:   No visits with results within 1 Day(s) from this visit.   Latest known visit with results is:   Admission on 12/20/2023, Discharged on 12/20/2023   Component Date Value    Case Report 12/20/2023                      Value:Surgical Pathology Report                         Case: [id number]                                   Authorizing Provider:  Jacquie Yousif MD        Collected:           12/20/2023 0834              Ordering Location:     [facility]        Received:            12/20/2023 1031                                      [facility]                                                     Pathologist:           Opal Wiseman MD                                                         Specimen:    Gallbladder, Gallbladdder                                                                  Final Diagnosis 12/20/2023                      Value:A. Gallbladder, cholecystectomy:                          - Acute and chronic cholecystitis with cholelithiasis and prominent                           eosinophils.                             -- Focal epithelial ulceration and erosion.                           - Reactive glandular atypia; negative for dysplasia and malignancy.      Additional Information 12/20/2023                      Value:All reported additional testing was performed with appropriately reactive                           controls.  These tests were developed and their performance                           characteristics determined by [facility] or                           appropriate performing facility, though some tests may be performed on                           tissues which have not been validated for performance characteristics                           (such as staining performed on alcohol exposed cell blocks and decalcified                           tissues).  Results should be interpreted with caution and in the context                           of the patients’ clinical condition. These tests may not be cleared or                           approved by the U.S. Food and Drug Administration, though the FDA has                           determined that such clearance or approval is not necessary. These tests                           are used for clinical purposes and they should not be regarded as                           investigational or for research. This laboratory has been approved by CLIA                           88, designated as a high-complexity laboratory and is  "qualified to perform                           these tests.                          Interpretation performed at [facility], [address]                           [id number].    Gross Description 12/20/2023                      Value:A. The specimen is received in formalin, labeled with the patient's name                           and hospital number, and is designated \"gallbladder\".  The specimen                           consists of a disrupted, galbladder measuring 5.3 x 1.9 x 1.5 cm.  The                           serosa is tan-purple, smooth and glistening.  There is  transmural defect                           measuring 1.2 x 0.1 cm  approximately 2.6 cm from the cystic duct margin.                            The cystic duct margin is inked blue.  The specimen is opened releasing                           multiple yellow-green stony fragments measuring in aggregate of 1.3 x 1.2                           x 0.4 cm.  The mucosa is tan and velvety and does not exhibit any areas of                           yellow flecking.  The gallbladder wall measures up to 0.2 cm in thickness.                            There are no abnormalities noted.  Representative section(s). One                           cassette.                                                     Note: The estimated total formalin fixation time based upon information                           provided by the submitting clinician and the standard processing schedule                           is under 72 hours. Niels    Clinical Information 12/20/2023                      Value:Calculus of gallbladder and bile duct [K80.70]     Operative Indications:  Symptomatic gallbladder disease         Radiology Results:   No results found.    I personally reviewed relevant labs as well as personally reviewed images and reports for relevant radiology studies in PACS. I also personally reviewed prior GI notes and relevant non-GI notes in the " patient's chart, as well as relevant external medical records in [facility].    Randolph FREYA Manning acknowledges that he has consented to an online visit or consultation, and confirmed that they are physically in the [facility] at the time of this video visit. he understands that the online visit is based solely on information provided by him, and that in the absence of a face-to-face physical evaluation by the physician the diagnosis he receives is both limited and provisional in terms of accuracy and completeness. This is not intended to replace a full medical face-to-face evaluation by the physician. Randolph Manning understands and accepts these terms.

## 2025-03-11 NOTE — ASSESSMENT & PLAN NOTE
- discussed dietary modification and weight loss.   - discussed intermittent drug holidays off PPI and potential long-term use adverse events.   - Can cont otc omep from now, or if loses weight and able to wean, can use Pepcid otc prn.  - discussed indication for HH repair would be sx refractory to medical management.  - EGD for BE screening.   Rtc prn

## 2025-03-12 ENCOUNTER — TELEPHONE (OUTPATIENT)
Dept: GASTROENTEROLOGY | Facility: CLINIC | Age: 40
End: 2025-03-12

## 2025-03-12 NOTE — TELEPHONE ENCOUNTER
Scheduled date of EGD(as of today):05/12/2025  Physician performing EGD:Anu Sierra  Location of EGD: [facility]  Instructions reviewed with patient by:NR  Clearances:  NONE     Patient will follow up as needed after the procedure

## 2025-05-09 RX ORDER — PROMETHAZINE HYDROCHLORIDE 25 MG/ML
12.5 INJECTION, SOLUTION INTRAMUSCULAR; INTRAVENOUS ONCE AS NEEDED
Status: CANCELLED | OUTPATIENT
Start: 2025-05-09

## 2025-05-09 RX ORDER — SODIUM CHLORIDE 9 MG/ML
125 INJECTION, SOLUTION INTRAVENOUS CONTINUOUS
Status: CANCELLED | OUTPATIENT
Start: 2025-05-09

## 2025-05-12 ENCOUNTER — HOSPITAL ENCOUNTER (OUTPATIENT)
Dept: GASTROENTEROLOGY | Facility: HOSPITAL | Age: 40
Setting detail: OUTPATIENT SURGERY
Discharge: HOME | End: 2025-05-12
Attending: INTERNAL MEDICINE
Payer: COMMERCIAL

## 2025-05-12 ENCOUNTER — ANESTHESIA EVENT (OUTPATIENT)
Dept: GASTROENTEROLOGY | Facility: HOSPITAL | Age: 40
End: 2025-05-12
Payer: COMMERCIAL

## 2025-05-12 ENCOUNTER — ANESTHESIA (OUTPATIENT)
Dept: GASTROENTEROLOGY | Facility: HOSPITAL | Age: 40
End: 2025-05-12
Payer: COMMERCIAL

## 2025-05-12 VITALS
TEMPERATURE: 97.7 F | HEART RATE: 89 BPM | DIASTOLIC BLOOD PRESSURE: 66 MMHG | RESPIRATION RATE: 16 BRPM | SYSTOLIC BLOOD PRESSURE: 120 MMHG | OXYGEN SATURATION: 97 %

## 2025-05-12 DIAGNOSIS — K21.9 GASTROESOPHAGEAL REFLUX DISEASE, UNSPECIFIED WHETHER ESOPHAGITIS PRESENT: ICD-10-CM

## 2025-05-12 RX ORDER — PROMETHAZINE HYDROCHLORIDE 25 MG/ML
12.5 INJECTION, SOLUTION INTRAMUSCULAR; INTRAVENOUS ONCE AS NEEDED
Status: DISCONTINUED | OUTPATIENT
Start: 2025-05-12 | End: 2025-05-16 | Stop reason: HOSPADM

## 2025-05-12 RX ORDER — SODIUM CHLORIDE 9 MG/ML
125 INJECTION, SOLUTION INTRAVENOUS CONTINUOUS
Status: DISCONTINUED | OUTPATIENT
Start: 2025-05-12 | End: 2025-05-16 | Stop reason: HOSPADM

## 2025-05-12 RX ORDER — LIDOCAINE HYDROCHLORIDE 20 MG/ML
INJECTION, SOLUTION EPIDURAL; INFILTRATION; INTRACAUDAL; PERINEURAL AS NEEDED
Status: DISCONTINUED | OUTPATIENT
Start: 2025-05-12 | End: 2025-05-12

## 2025-05-12 RX ORDER — FENTANYL CITRATE 50 UG/ML
INJECTION, SOLUTION INTRAMUSCULAR; INTRAVENOUS AS NEEDED
Status: DISCONTINUED | OUTPATIENT
Start: 2025-05-12 | End: 2025-05-12

## 2025-05-12 RX ORDER — SODIUM CHLORIDE, SODIUM LACTATE, POTASSIUM CHLORIDE, CALCIUM CHLORIDE 600; 310; 30; 20 MG/100ML; MG/100ML; MG/100ML; MG/100ML
125 INJECTION, SOLUTION INTRAVENOUS CONTINUOUS
Status: DISCONTINUED | OUTPATIENT
Start: 2025-05-12 | End: 2025-05-16 | Stop reason: HOSPADM

## 2025-05-12 RX ORDER — PROPOFOL 10 MG/ML
INJECTION, EMULSION INTRAVENOUS AS NEEDED
Status: DISCONTINUED | OUTPATIENT
Start: 2025-05-12 | End: 2025-05-12

## 2025-05-12 RX ADMIN — Medication 40 MG: at 08:34

## 2025-05-12 RX ADMIN — PROPOFOL 150 MG: 10 INJECTION, EMULSION INTRAVENOUS at 08:30

## 2025-05-12 RX ADMIN — SODIUM CHLORIDE, SODIUM LACTATE, POTASSIUM CHLORIDE, AND CALCIUM CHLORIDE 125 ML/HR: .6; .31; .03; .02 INJECTION, SOLUTION INTRAVENOUS at 07:58

## 2025-05-12 RX ADMIN — FENTANYL CITRATE 25 MCG: 50 INJECTION INTRAMUSCULAR; INTRAVENOUS at 08:30

## 2025-05-12 RX ADMIN — PROPOFOL 100 MG: 10 INJECTION, EMULSION INTRAVENOUS at 08:34

## 2025-05-12 RX ADMIN — PROPOFOL 50 MG: 10 INJECTION, EMULSION INTRAVENOUS at 08:37

## 2025-05-12 RX ADMIN — LIDOCAINE HYDROCHLORIDE 5 ML: 20 INJECTION, SOLUTION EPIDURAL; INFILTRATION; INTRACAUDAL at 08:30

## 2025-05-12 NOTE — ANESTHESIA PREPROCEDURE EVALUATION
Procedure:  EGD    Relevant Problems   GI/HEPATIC   (+) Gastroesophageal reflux disease      Blood   (+) Heterozygous factor V Leiden mutation (HCC)      Dermatology   (+) Psoriasis      Other   (+) Carrier of hemochromatosis HFE gene mutation        Physical Exam    Airway  Comment: Full beard  Mallampati score: II  TM Distance: <3 FB       Dental   No notable dental hx     Cardiovascular  Rhythm: regular, Rate: normal    Pulmonary   Breath sounds clear to auscultation    Other Findings        Anesthesia Plan  ASA Score- 2     Anesthesia Type- IV sedation with anesthesia with ASA Monitors.         Additional Monitors:     Airway Plan:     Comment: GA PRN.       Plan Factors-Exercise tolerance (METS): >4 METS.    Chart reviewed.   Existing labs reviewed.     Patient is not a current smoker.      Obstructive sleep apnea risk education given perioperatively.        Induction- intravenous.    Postoperative Plan-         Informed Consent- Anesthetic plan and risks discussed with patient.        NPO Status:  Vitals Value Taken Time   Date of last liquid 05/11/25 05/12/25 0742   Time of last liquid 0000 05/12/25 0742   Date of last solid 05/11/25 05/12/25 0742   Time of last solid 0000 05/12/25 0742

## 2025-05-12 NOTE — ANESTHESIA POSTPROCEDURE EVALUATION
Post-Op Assessment Note    CV Status:  Stable    Pain management: adequate       Mental Status:  Awake   Hydration Status:  Stable   PONV Controlled:  Controlled   Airway Patency:  Patent     Post Op Vitals Reviewed: Yes    No anethesia notable event occurred.    Staff: Anesthesiologist           Last Filed PACU Vitals:  Vitals Value Taken Time   Temp 97.7 °F (36.5 °C) 05/12/25 0847   Pulse 89 05/12/25 0902   /66 05/12/25 0902   Resp 16 05/12/25 0902   SpO2 97 % 05/12/25 0902       Modified Keturah:     Vitals Value Taken Time   Activity 2 05/12/25 0902   Respiration 2 05/12/25 0902   Circulation 2 05/12/25 0902   Consciousness 2 05/12/25 0902   Oxygen Saturation 2 05/12/25 0902     Modified Keturah Score: 10             No

## 2025-05-12 NOTE — H&P
History and Physical - [facility]  Randolph Manning 39 y.o. male MRN: [id number]                  HPI: Randolph Manning is a 39 y.o. year old male who presents for EGD for GERD.      REVIEW OF SYSTEMS: Per the HPI, and otherwise unremarkable.    Historical Information   Past Medical History:   Diagnosis Date    COVID-19 virus infection 01/2021    Psoriasis      Past Surgical History:   Procedure Laterality Date    [place] LAPAROSCOPY SURG CHOLECYSTECTOMY N/A 12/20/2023    Procedure: CHOLECYSTECTOMY  W/ ROBOT and LYSIS OF ADHESIONS;  Surgeon: Jacquie Yousif MD;  Location: [facility];  Service: General    UNDESCENDED TESTICLE EXPLORATION       Social History   Social History     Substance and Sexual Activity   Alcohol Use Yes    Alcohol/week: 1.0 standard drink of alcohol    Types: 1 Cans of beer per week    Comment: occasional     Social History     Substance and Sexual Activity   Drug Use Never     Social History     Tobacco Use   Smoking Status Former    Types: Cigarettes    Passive exposure: Never   Smokeless Tobacco Never     Family History   Problem Relation Age of Onset    Hypertension Father     Hypertension Brother     Pancreatic cancer Maternal Grandmother     Throat cancer Paternal Grandfather        Meds/Allergies       Current Outpatient Medications:     omeprazole (PriLOSEC) 20 mg delayed release capsule    ibuprofen (MOTRIN) 600 mg tablet    Current Facility-Administered Medications:     lactated ringers infusion, 125 mL/hr, Intravenous, Continuous, 125 mL/hr at 05/12/25 0758    sodium chloride 0.9 % infusion, 125 mL/hr, Intravenous, Continuous    Allergies   Allergen Reactions    Cefaclor Rash and Edema       Objective     /90   Pulse 71   Temp 98.1 °F (36.7 °C) (Temporal)   Resp 16   SpO2 99%       PHYSICAL EXAM    Gen: NAD  Head: NCAT  CV: RRR  CHEST: Clear  ABD: soft, NT/ND  EXT: no edema      ASSESSMENT/PLAN:  This is a 39 y.o. year old male here for EGD, and he is stable  and optimized for his procedure. We discussed potential adverse events related with the procedure. They verbalized their understanding and are in agreement with proceeding with the procedure.

## (undated) DEVICE — ARM DRAPE

## (undated) DEVICE — MAYO STAND COVER: Brand: CONVERTORS

## (undated) DEVICE — DECANTER: Brand: UNBRANDED

## (undated) DEVICE — SUT PDS II 0 CT-2 27 IN Z334H

## (undated) DEVICE — BLADELESS OBTURATOR: Brand: WECK VISTA

## (undated) DEVICE — PMI DISPOSABLE PUNCTURE CLOSURE DEVICE / SUTURE GRASPER: Brand: PMI

## (undated) DEVICE — SUT MONOCRYL 4-0 PS-2 27 IN Y426H

## (undated) DEVICE — DRAPE EQUIPMENT RF WAND

## (undated) DEVICE — ADHESIVE SKIN HIGH VISCOSITY EXOFIN 1ML

## (undated) DEVICE — TROCAR: Brand: KII FIOS FIRST ENTRY

## (undated) DEVICE — PERMANENT CAUTERY HOOK: Brand: ENDOWRIST

## (undated) DEVICE — ASTOUND STANDARD SURGICAL GOWN, XL: Brand: CONVERTORS

## (undated) DEVICE — ALLENTOWN LAP CHOLE APP PACK: Brand: CARDINAL HEALTH

## (undated) DEVICE — TELFA NON-ADHERENT ABSORBENT DRESSING: Brand: TELFA

## (undated) DEVICE — DISPOSABLE OR TOWEL: Brand: CARDINAL HEALTH

## (undated) DEVICE — METZENBAUM ADTEC SINGLE USE DISSECTING SCISSORS, SHAFT ONLY, MONOPOLAR, CURVED TO LEFT, WORKING LENGTH: 12 1/4", (310 MM), DIAM. 5 MM, INSULATED, DOUBLE ACTION, STERILE, DISPOSABLE, PACKAGE OF 10 PIECES: Brand: AESCULAP

## (undated) DEVICE — 3000CC GUARDIAN II: Brand: GUARDIAN

## (undated) DEVICE — DRESSING TELFA 2 X 3 IN STRL

## (undated) DEVICE — INTENDED FOR TISSUE SEPARATION, AND OTHER PROCEDURES THAT REQUIRE A SHARP SURGICAL BLADE TO PUNCTURE OR CUT.: Brand: BARD-PARKER SAFETY BLADES SIZE 15, STERILE

## (undated) DEVICE — COLUMN DRAPE

## (undated) DEVICE — MEDIUM-LARGE CLIP APPLIER: Brand: ENDOWRIST

## (undated) DEVICE — GLOVE SRG BIOGEL 6.5

## (undated) DEVICE — HEM-O-LOK CLIP CARTRIDGE MED/LARGE DA VINCI SI/XI

## (undated) DEVICE — CANNULA SEAL

## (undated) DEVICE — NEEDLE HYPO 22G X 1-1/2 IN

## (undated) DEVICE — GLOVE INDICATOR PI UNDERGLOVE SZ 6.5 BLUE

## (undated) DEVICE — LIGAMAX 5 MM ENDOSCOPIC MULTIPLE CLIP APPLIER: Brand: LIGAMAX

## (undated) DEVICE — 3M™ STERI-STRIP™ REINFORCED ADHESIVE SKIN CLOSURES, R1547, 1/2 IN X 4 IN (12 MM X 100 MM), 6 STRIPS/ENVELOPE: Brand: 3M™ STERI-STRIP™

## (undated) DEVICE — CHLORAPREP HI-LITE 26ML ORANGE

## (undated) DEVICE — [HIGH FLOW INSUFFLATOR,  DO NOT USE IF PACKAGE IS DAMAGED,  KEEP DRY,  KEEP AWAY FROM SUNLIGHT,  PROTECT FROM HEAT AND RADIOACTIVE SOURCES.]: Brand: PNEUMOSURE

## (undated) DEVICE — TISSUE RETRIEVAL SYSTEM: Brand: INZII RETRIEVAL SYSTEM

## (undated) DEVICE — PROGRASP FORCEPS: Brand: ENDOWRIST